# Patient Record
Sex: FEMALE | Race: WHITE | NOT HISPANIC OR LATINO | Employment: FULL TIME | URBAN - METROPOLITAN AREA
[De-identification: names, ages, dates, MRNs, and addresses within clinical notes are randomized per-mention and may not be internally consistent; named-entity substitution may affect disease eponyms.]

---

## 2018-06-28 ENCOUNTER — APPOINTMENT (OUTPATIENT)
Dept: OTHER | Facility: IMAGING CENTER | Age: 33
End: 2018-06-28

## 2018-07-18 ENCOUNTER — APPOINTMENT (OUTPATIENT)
Dept: OTHER | Facility: IMAGING CENTER | Age: 33
End: 2018-07-18

## 2018-07-28 ENCOUNTER — HOSPITAL ENCOUNTER (INPATIENT)
Facility: MEDICAL CENTER | Age: 33
LOS: 2 days | End: 2018-07-30
Admitting: OBSTETRICS & GYNECOLOGY
Payer: COMMERCIAL

## 2018-07-28 LAB
BASOPHILS # BLD AUTO: 0.2 % (ref 0–1.8)
BASOPHILS # BLD: 0.03 K/UL (ref 0–0.12)
EOSINOPHIL # BLD AUTO: 0 K/UL (ref 0–0.51)
EOSINOPHIL NFR BLD: 0 % (ref 0–6.9)
ERYTHROCYTE [DISTWIDTH] IN BLOOD BY AUTOMATED COUNT: 42.7 FL (ref 35.9–50)
HCT VFR BLD AUTO: 40 % (ref 37–47)
HGB BLD-MCNC: 14.4 G/DL (ref 12–16)
HOLDING TUBE BB 8507: NORMAL
IMM GRANULOCYTES # BLD AUTO: 0.1 K/UL (ref 0–0.11)
IMM GRANULOCYTES NFR BLD AUTO: 0.5 % (ref 0–0.9)
LYMPHOCYTES # BLD AUTO: 0.5 K/UL (ref 1–4.8)
LYMPHOCYTES NFR BLD: 2.6 % (ref 22–41)
MCH RBC QN AUTO: 33.7 PG (ref 27–33)
MCHC RBC AUTO-ENTMCNC: 36 G/DL (ref 33.6–35)
MCV RBC AUTO: 93.7 FL (ref 81.4–97.8)
MONOCYTES # BLD AUTO: 0.89 K/UL (ref 0–0.85)
MONOCYTES NFR BLD AUTO: 4.6 % (ref 0–13.4)
NEUTROPHILS # BLD AUTO: 17.91 K/UL (ref 2–7.15)
NEUTROPHILS NFR BLD: 92.1 % (ref 44–72)
NRBC # BLD AUTO: 0 K/UL
NRBC BLD-RTO: 0 /100 WBC
PLATELET # BLD AUTO: 225 K/UL (ref 164–446)
PMV BLD AUTO: 11 FL (ref 9–12.9)
RBC # BLD AUTO: 4.27 M/UL (ref 4.2–5.4)
WBC # BLD AUTO: 19.4 K/UL (ref 4.8–10.8)

## 2018-07-28 PROCEDURE — 10S0XZZ REPOSITION PRODUCTS OF CONCEPTION, EXTERNAL APPROACH: ICD-10-PCS | Performed by: OBSTETRICS & GYNECOLOGY

## 2018-07-28 PROCEDURE — 700105 HCHG RX REV CODE 258: Performed by: NURSE PRACTITIONER

## 2018-07-28 PROCEDURE — 770002 HCHG ROOM/CARE - OB PRIVATE (112)

## 2018-07-28 PROCEDURE — 85025 COMPLETE CBC W/AUTO DIFF WBC: CPT

## 2018-07-28 RX ORDER — OXYTOCIN 10 [USP'U]/ML
10 INJECTION, SOLUTION INTRAMUSCULAR; INTRAVENOUS ONCE
Status: COMPLETED | OUTPATIENT
Start: 2018-07-28 | End: 2018-07-29

## 2018-07-28 RX ORDER — ALUMINA, MAGNESIA, AND SIMETHICONE 2400; 2400; 240 MG/30ML; MG/30ML; MG/30ML
30 SUSPENSION ORAL EVERY 6 HOURS PRN
Status: DISCONTINUED | OUTPATIENT
Start: 2018-07-28 | End: 2018-07-29 | Stop reason: HOSPADM

## 2018-07-28 RX ORDER — DEXTROSE, SODIUM CHLORIDE, SODIUM LACTATE, POTASSIUM CHLORIDE, AND CALCIUM CHLORIDE 5; .6; .31; .03; .02 G/100ML; G/100ML; G/100ML; G/100ML; G/100ML
INJECTION, SOLUTION INTRAVENOUS CONTINUOUS
Status: DISCONTINUED | OUTPATIENT
Start: 2018-07-28 | End: 2018-07-30 | Stop reason: HOSPADM

## 2018-07-28 RX ORDER — MISOPROSTOL 200 UG/1
800 TABLET ORAL
Status: COMPLETED | OUTPATIENT
Start: 2018-07-28 | End: 2018-07-29

## 2018-07-28 RX ORDER — SODIUM CHLORIDE, SODIUM LACTATE, POTASSIUM CHLORIDE, CALCIUM CHLORIDE 600; 310; 30; 20 MG/100ML; MG/100ML; MG/100ML; MG/100ML
INJECTION, SOLUTION INTRAVENOUS CONTINUOUS
Status: DISPENSED | OUTPATIENT
Start: 2018-07-28 | End: 2018-07-28

## 2018-07-28 RX ADMIN — SODIUM CHLORIDE, POTASSIUM CHLORIDE, SODIUM LACTATE AND CALCIUM CHLORIDE: 600; 310; 30; 20 INJECTION, SOLUTION INTRAVENOUS at 14:18

## 2018-07-28 RX ADMIN — SODIUM CHLORIDE, SODIUM LACTATE, POTASSIUM CHLORIDE, CALCIUM CHLORIDE AND DEXTROSE MONOHYDRATE: 5; 600; 310; 30; 20 INJECTION, SOLUTION INTRAVENOUS at 21:54

## 2018-07-28 ASSESSMENT — COPD QUESTIONNAIRES
DURING THE PAST 4 WEEKS HOW MUCH DID YOU FEEL SHORT OF BREATH: NONE/LITTLE OF THE TIME
IN THE PAST 12 MONTHS DO YOU DO LESS THAN YOU USED TO BECAUSE OF YOUR BREATHING PROBLEMS: DISAGREE/UNSURE
COPD SCREENING SCORE: 0
HAVE YOU SMOKED AT LEAST 100 CIGARETTES IN YOUR ENTIRE LIFE: NO/DON'T KNOW
DO YOU EVER COUGH UP ANY MUCUS OR PHLEGM?: NO/ONLY WITH OCCASIONAL COLDS OR INFECTIONS

## 2018-07-28 ASSESSMENT — PATIENT HEALTH QUESTIONNAIRE - PHQ9
2. FEELING DOWN, DEPRESSED, IRRITABLE, OR HOPELESS: NOT AT ALL
1. LITTLE INTEREST OR PLEASURE IN DOING THINGS: NOT AT ALL
1. LITTLE INTEREST OR PLEASURE IN DOING THINGS: NOT AT ALL
2. FEELING DOWN, DEPRESSED, IRRITABLE, OR HOPELESS: NOT AT ALL
SUM OF ALL RESPONSES TO PHQ9 QUESTIONS 1 AND 2: 0
SUM OF ALL RESPONSES TO PHQ9 QUESTIONS 1 AND 2: 0

## 2018-07-28 ASSESSMENT — LIFESTYLE VARIABLES
ALCOHOL_USE: NO
EVER_SMOKED: NEVER

## 2018-07-28 NOTE — PROGRESS NOTES
1145- patient presents to LND with contractions every 2 minutes. States she has been leaking clear fluid since 7/27 at 2215. Patient has been having contractions since 0400. EFM and TOCO applied. Positive fetal heart tones. Patient , Nerissa patient's Duola and BILLIE Gan CNM at bedside.    1240- strip reviewed by BILLIE Gan CNM. Patient off EFM. Patient up to shower.     1345- patient back in bed. EFM and TOCO applied. Patient breathing well with contractions. Nerissa moncada and BILLIE Gan CNM at bedside.     1410- SVE as noted. Patient breathing well with contractions.     1615- SVE as noted. BILLIE Gan CNM remains at bedside    1900- report given to LAMAR Garrido RN

## 2018-07-28 NOTE — H&P
History and Physical      Lisa Sam is a 33 y.o. female  at 39w5d GA, admitted with C/O SROM at 2215 last night and regular contractions    Subjective:   positive  For CTXS.   positive Feels pain   positive for LOF  negative for vaginal bleeding.   positive for fetal movement    ROS: Pertinent items are noted in HPI.    History reviewed. No pertinent past medical history.  History reviewed. No pertinent surgical history.  OB History    Para Term  AB Living   1             SAB TAB Ectopic Molar Multiple Live Births                    # Outcome Date GA Lbr Balwinder/2nd Weight Sex Delivery Anes PTL Lv   1 Current                 Social History     Social History   • Marital status:      Spouse name: N/A   • Number of children: N/A   • Years of education: N/A     Occupational History   • Not on file.     Social History Main Topics   • Smoking status: Not on file   • Smokeless tobacco: Not on file   • Alcohol use Not on file   • Drug use: Unknown   • Sexual activity: Not on file     Other Topics Concern   • Not on file     Social History Narrative   • No narrative on file     Allergies: Sulfa drugs    Current Facility-Administered Medications:   •  LR infusion, , Intravenous, Continuous, Lori Gan, A.P.N.  •  fentaNYL (SUBLIMAZE) injection 50 mcg, 50 mcg, Intravenous, Q HOUR PRN, Lori Gan, A.P.N.  •  fentaNYL (SUBLIMAZE) injection 100 mcg, 100 mcg, Intravenous, Q HOUR PRN, Lori Gan, A.P.N.  •  mag hydrox-al hydrox-simeth (MAALOX PLUS ES or MYLANTA DS) suspension 30 mL, 30 mL, Oral, Q6HRS PRN, Lori Gan, A.P.N.  •  miSOPROStol (CYTOTEC) tablet 800 mcg, 800 mcg, Rectal, Once PRN, Lori Gan, A.P.N.    Prenatal care with Dr Salamanca until 3rd trimester starting in first trimester with transfer to [facility] at 34 weeks GA.  Her prenatal course is uncomplicated.  There are no active problems to display for this patient.              Objective:     "  Blood pressure 117/80, pulse 73, temperature 37.4 °C (99.3 °F), temperature source Temporal, resp. rate 18, height 1.651 m (5' 5\"), weight 68 kg (150 lb), last menstrual period 10/23/2017.    General:   alert, cooperative, appears fatigued   Skin:   normal   HEENT:  not evaluated   Lungs:   CTA bilateral   Heart:   S1, S2 normal, no murmur, click, rub or gallop, regular rate and rhythm, brisk carotid upstroke without bruits, peripheral pulses very brisk, chest is clear without rales or wheezing, no pedal edema, no JVD, no hepatosplenomegaly   Abdomen:   gravid, NT, Vtx by Leopolds,   EFW:  3200 gm   Pelvis:  adequate with gynecoid pelvis   FHT:  125 BPM   Uterine Size: S=D   Presentations: Cephalic   Cervix:     Dilation: 4-5    Effacement: 95%    Station:  -1    Consistency: Soft    Position: Middle     Lab Review  Lab:   Blood type: O positive, negative Ab screen, GBS negative, Hep B negative, rubella Immune, VDRL negative, HIV negative    No results found for this or any previous visit (from the past 5880 hour(s)).     Assessment:   Lisa Flaco at Unknown  Labor status: Active phase labor, SROM at 2215 7/27/18 clear,  GBS negative  Appropriate candidate for midwifery care  Category ! FHR, Reactive  Obstetrical history significant for There are no active problems to display for this patient.  .      Plan:     Admit to L&D  Intermittent monitoring, saline lock, labor support  Plans non-pharmacological pain management  Dr. Huntley (backup) informed of admit  GBS negative therefor no ABx               "

## 2018-07-28 NOTE — PROGRESS NOTES
Subjective:  Uterine contractions:yes  Pain: yes and coping well with labor support, complains of some back pain    Objective:  Fetal heart variability: moderate  Fetal Heart Rate decelerations: none  Fetal Heart Rate accelerations: yes  Baseline FHR: 135 per minute  Uterine contractions: regular, every 2 minutes  Cervical: 100% ;  Dilatation .6 ; Station positive1    Fetal position: Cephalic, LOT  Membranes ruptured: .yes, clear    Assessment:   32 yo G1 at 39w5d  Labor State: Active phase labor, SROM, clear, GBS negative  Category 1 FHR     Plan:  IV hydration  Labor support  Anticipate NSVB

## 2018-07-28 NOTE — PROGRESS NOTES
"Labor Progress Note    Lisa Flaco   34 yo G1 at 39w5d      Subjective:  Uterine contractions:yes  Pain: Not Asked, complains of urge to push, coping well with contractions and labor support    Objective:   Vitals:    07/28/18 1150   BP: 117/80   Pulse: 73   Resp: 18   Temp: 37.4 °C (99.3 °F)   TempSrc: Temporal   Weight: 68 kg (150 lb)   Height: 1.651 m (5' 5\")     Fetal heart variability: moderate  Fetal Heart Rate decelerations: early  Fetal Heart Rate accelerations: yes  Baseline FHR: 140 per minute  Fetal Non-stress Test: reactive tracing  Uterine contractions: regular, every 2-3 minutes  Cervical: 100% ;  Dilatation .7 ; Station positive1    Membranes ruptured: .yes    Meds:   Epidural : .no  Pitocin: .no    Labs:  Recent Results (from the past 24 hour(s))   Hold Blood Bank Specimen (Not Tested)    Collection Time: 07/28/18  2:30 PM   Result Value Ref Range    Holding Tube - Bb DONE    CBC WITH DIFFERENTIAL    Collection Time: 07/28/18  2:30 PM   Result Value Ref Range    WBC 19.4 (H) 4.8 - 10.8 K/uL    RBC 4.27 4.20 - 5.40 M/uL    Hemoglobin 14.4 12.0 - 16.0 g/dL    Hematocrit 40.0 37.0 - 47.0 %    MCV 93.7 81.4 - 97.8 fL    MCH 33.7 (H) 27.0 - 33.0 pg    MCHC 36.0 (H) 33.6 - 35.0 g/dL    RDW 42.7 35.9 - 50.0 fL    Platelet Count 225 164 - 446 K/uL    MPV 11.0 9.0 - 12.9 fL    Neutrophils-Polys 92.10 (H) 44.00 - 72.00 %    Lymphocytes 2.60 (L) 22.00 - 41.00 %    Monocytes 4.60 0.00 - 13.40 %    Eosinophils 0.00 0.00 - 6.90 %    Basophils 0.20 0.00 - 1.80 %    Immature Granulocytes 0.50 0.00 - 0.90 %    Nucleated RBC 0.00 /100 WBC    Neutrophils (Absolute) 17.91 (H) 2.00 - 7.15 K/uL    Lymphs (Absolute) 0.50 (L) 1.00 - 4.80 K/uL    Monos (Absolute) 0.89 (H) 0.00 - 0.85 K/uL    Eos (Absolute) 0.00 0.00 - 0.51 K/uL    Baso (Absolute) 0.03 0.00 - 0.12 K/uL    Immature Granulocytes (abs) 0.10 0.00 - 0.11 K/uL    NRBC (Absolute) 0.00 K/uL       Assessment  34 yo G1 at 39w5d GA, SROM clear, GBS negative  Labor " State: Active phase labor, desires un-medicated birth    Plan:  Expectant management, labor support with right side lying and peanut ball  Saline lock and po hydration   Anticipate NSVB  Dr. Huntley (backup) provided update        Lori Gan

## 2018-07-29 LAB
ERYTHROCYTE [DISTWIDTH] IN BLOOD BY AUTOMATED COUNT: 45.6 FL (ref 35.9–50)
HCT VFR BLD AUTO: 31.6 % (ref 37–47)
HGB BLD-MCNC: 10.9 G/DL (ref 12–16)
MCH RBC QN AUTO: 33.4 PG (ref 27–33)
MCHC RBC AUTO-ENTMCNC: 34.6 G/DL (ref 33.6–35)
MCV RBC AUTO: 96.6 FL (ref 81.4–97.8)
PLATELET # BLD AUTO: 188 K/UL (ref 164–446)
PMV BLD AUTO: 10.8 FL (ref 9–12.9)
RBC # BLD AUTO: 3.23 M/UL (ref 4.2–5.4)
WBC # BLD AUTO: 14.7 K/UL (ref 4.8–10.8)

## 2018-07-29 PROCEDURE — 770002 HCHG ROOM/CARE - OB PRIVATE (112)

## 2018-07-29 PROCEDURE — 700102 HCHG RX REV CODE 250 W/ 637 OVERRIDE(OP)

## 2018-07-29 PROCEDURE — 36415 COLL VENOUS BLD VENIPUNCTURE: CPT

## 2018-07-29 PROCEDURE — 304965 HCHG RECOVERY SERVICES

## 2018-07-29 PROCEDURE — 59409 OBSTETRICAL CARE: CPT

## 2018-07-29 PROCEDURE — 700111 HCHG RX REV CODE 636 W/ 250 OVERRIDE (IP)

## 2018-07-29 PROCEDURE — 0HQ9XZZ REPAIR PERINEUM SKIN, EXTERNAL APPROACH: ICD-10-PCS | Performed by: OBSTETRICS & GYNECOLOGY

## 2018-07-29 PROCEDURE — A9270 NON-COVERED ITEM OR SERVICE: HCPCS

## 2018-07-29 PROCEDURE — 85027 COMPLETE CBC AUTOMATED: CPT

## 2018-07-29 PROCEDURE — 700111 HCHG RX REV CODE 636 W/ 250 OVERRIDE (IP): Performed by: NURSE PRACTITIONER

## 2018-07-29 RX ORDER — HYDROCODONE BITARTRATE AND ACETAMINOPHEN 5; 325 MG/1; MG/1
1 TABLET ORAL EVERY 4 HOURS PRN
Status: DISCONTINUED | OUTPATIENT
Start: 2018-07-29 | End: 2018-07-30 | Stop reason: HOSPADM

## 2018-07-29 RX ORDER — MISOPROSTOL 200 UG/1
TABLET ORAL
Status: COMPLETED
Start: 2018-07-29 | End: 2018-07-29

## 2018-07-29 RX ORDER — LIDOCAINE HYDROCHLORIDE 10 MG/ML
20 INJECTION, SOLUTION INFILTRATION; PERINEURAL ONCE
Status: COMPLETED | OUTPATIENT
Start: 2018-07-29 | End: 2018-07-29

## 2018-07-29 RX ORDER — ACETAMINOPHEN 325 MG/1
650 TABLET ORAL EVERY 4 HOURS PRN
Status: DISCONTINUED | OUTPATIENT
Start: 2018-07-29 | End: 2018-07-30 | Stop reason: HOSPADM

## 2018-07-29 RX ORDER — IBUPROFEN 600 MG/1
600 TABLET ORAL EVERY 6 HOURS PRN
Status: DISCONTINUED | OUTPATIENT
Start: 2018-07-29 | End: 2018-07-30 | Stop reason: HOSPADM

## 2018-07-29 RX ORDER — VITAMIN A ACETATE, BETA CAROTENE, ASCORBIC ACID, CHOLECALCIFEROL, .ALPHA.-TOCOPHEROL ACETATE, DL-, THIAMINE MONONITRATE, RIBOFLAVIN, NIACINAMIDE, PYRIDOXINE HYDROCHLORIDE, FOLIC ACID, CYANOCOBALAMIN, CALCIUM CARBONATE, FERROUS FUMARATE, ZINC OXIDE, CUPRIC OXIDE 3080; 12; 120; 400; 1; 1.84; 3; 20; 22; 920; 25; 200; 27; 10; 2 [IU]/1; UG/1; MG/1; [IU]/1; MG/1; MG/1; MG/1; MG/1; MG/1; [IU]/1; MG/1; MG/1; MG/1; MG/1; MG/1
1 TABLET, FILM COATED ORAL EVERY MORNING
Status: DISCONTINUED | OUTPATIENT
Start: 2018-07-30 | End: 2018-07-30 | Stop reason: HOSPADM

## 2018-07-29 RX ORDER — ONDANSETRON 2 MG/ML
4 INJECTION INTRAMUSCULAR; INTRAVENOUS EVERY 6 HOURS PRN
Status: DISCONTINUED | OUTPATIENT
Start: 2018-07-29 | End: 2018-07-30 | Stop reason: HOSPADM

## 2018-07-29 RX ORDER — METHYLERGONOVINE MALEATE 0.2 MG/ML
0.2 INJECTION INTRAVENOUS PRN
Status: DISCONTINUED | OUTPATIENT
Start: 2018-07-29 | End: 2018-07-30 | Stop reason: HOSPADM

## 2018-07-29 RX ORDER — DOCUSATE SODIUM 100 MG/1
100 CAPSULE, LIQUID FILLED ORAL 2 TIMES DAILY PRN
Status: DISCONTINUED | OUTPATIENT
Start: 2018-07-29 | End: 2018-07-30 | Stop reason: HOSPADM

## 2018-07-29 RX ORDER — HYDROCODONE BITARTRATE AND ACETAMINOPHEN 10; 325 MG/1; MG/1
1 TABLET ORAL EVERY 4 HOURS PRN
Status: DISCONTINUED | OUTPATIENT
Start: 2018-07-29 | End: 2018-07-30 | Stop reason: HOSPADM

## 2018-07-29 RX ORDER — HYDROCODONE BITARTRATE AND ACETAMINOPHEN 5; 325 MG/1; MG/1
2 TABLET ORAL EVERY 4 HOURS PRN
Status: DISCONTINUED | OUTPATIENT
Start: 2018-07-29 | End: 2018-07-30 | Stop reason: HOSPADM

## 2018-07-29 RX ORDER — MISOPROSTOL 200 UG/1
800 TABLET ORAL PRN
Status: DISCONTINUED | OUTPATIENT
Start: 2018-07-29 | End: 2018-07-30 | Stop reason: HOSPADM

## 2018-07-29 RX ORDER — ACETAMINOPHEN 325 MG/1
325 TABLET ORAL EVERY 4 HOURS PRN
Status: DISCONTINUED | OUTPATIENT
Start: 2018-07-29 | End: 2018-07-30 | Stop reason: HOSPADM

## 2018-07-29 RX ORDER — ONDANSETRON 4 MG/1
4 TABLET, ORALLY DISINTEGRATING ORAL EVERY 6 HOURS PRN
Status: DISCONTINUED | OUTPATIENT
Start: 2018-07-29 | End: 2018-07-30 | Stop reason: HOSPADM

## 2018-07-29 RX ORDER — LIDOCAINE HYDROCHLORIDE 10 MG/ML
INJECTION, SOLUTION EPIDURAL; INFILTRATION; INTRACAUDAL; PERINEURAL
Status: COMPLETED
Start: 2018-07-29 | End: 2018-07-29

## 2018-07-29 RX ADMIN — MISOPROSTOL 800 MCG: 200 TABLET ORAL at 01:38

## 2018-07-29 RX ADMIN — LIDOCAINE HYDROCHLORIDE 30 ML: 10 INJECTION, SOLUTION INFILTRATION; PERINEURAL at 01:36

## 2018-07-29 RX ADMIN — LIDOCAINE HYDROCHLORIDE 30 ML: 10 INJECTION, SOLUTION EPIDURAL; INFILTRATION; INTRACAUDAL; PERINEURAL at 01:36

## 2018-07-29 RX ADMIN — OXYTOCIN 10 UNITS: 10 INJECTION, SOLUTION INTRAMUSCULAR; INTRAVENOUS at 01:21

## 2018-07-29 RX ADMIN — Medication 20 UNITS: at 01:48

## 2018-07-29 RX ADMIN — Medication 20 UNITS: at 02:21

## 2018-07-29 ASSESSMENT — PATIENT HEALTH QUESTIONNAIRE - PHQ9
2. FEELING DOWN, DEPRESSED, IRRITABLE, OR HOPELESS: NOT AT ALL
SUM OF ALL RESPONSES TO PHQ9 QUESTIONS 1 AND 2: 0
SUM OF ALL RESPONSES TO PHQ9 QUESTIONS 1 AND 2: 0
1. LITTLE INTEREST OR PLEASURE IN DOING THINGS: NOT AT ALL
1. LITTLE INTEREST OR PLEASURE IN DOING THINGS: NOT AT ALL
2. FEELING DOWN, DEPRESSED, IRRITABLE, OR HOPELESS: NOT AT ALL

## 2018-07-29 ASSESSMENT — PAIN SCALES - GENERAL
PAINLEVEL_OUTOF10: 3
PAINLEVEL_OUTOF10: 0
PAINLEVEL_OUTOF10: 0
PAINLEVEL_OUTOF10: 1
PAINLEVEL_OUTOF10: 1
PAINLEVEL_OUTOF10: 0

## 2018-07-29 NOTE — PROGRESS NOTES
-Bedside report received from EDYTA Cullen. POC discussed. KEMAL aGn at bedside along with FOB. Pt breathing well through contractions. VSS. No  Needs at this time   - Pt reports increasing pressure. SVE performed by BILLIE Gan CNM 2130- REBEKAH Gan at bedside continuously providing labor support to pt. SVE unchanged at .  2154-D5LR started per Gregory orders. Pt position changed to assist with labor progression. Pt swaying at side of bed and visited BR.   - REBEKAH Gan at bedside SVE complete but a anterior lip present but able to be pushed back with uc's.   0118-  of viable male with compound right arm anterior.  apgars 8/9.   0121-10 units IM Pitocin given per REBEKAH Gan orders.   0148- Heavy bleeding noted by REBEKAH Gan, orders to hang IV Pitocin and give cytotec.   EBL 600mls. Pads weighed for accuracy.   0200- Fundus firm at U with moderate/heavy rubra lochia. (see flowsheet)  0245-Pt assisted up to BR and voided successfully. Fundus firm with light lochia rubra.   0300-Pt transferred to PPU with infant in arms and bedside report given to EDYTA Nguyen.

## 2018-07-29 NOTE — PROGRESS NOTES
Pt transferred to floor from labor and delivery.  Pt oriented to room, safety procedures and call light. Pt is A&Ox4. Denies pain.  Assessment complete. Fundus firm, lochia mod. States voiding w/out difficulty, - for flatus. Plan of care reviewed, call light enc.  FOB @ bedside, bonding well w/.

## 2018-07-29 NOTE — CARE PLAN
Problem: Pain  Goal: Alleviation of Pain or a reduction in pain to the patient's comfort goal  Outcome: PROGRESSING AS EXPECTED  Patient wants a natural unassisted labor. Offered different positions and tools for patient management     Problem: Risk for Infection, Impaired Wound Healing  Goal: Remain free from signs and symptoms of infection  Outcome: PROGRESSING AS EXPECTED  Patient remains afebrile. No S&S of infections

## 2018-07-29 NOTE — PROGRESS NOTES
"Labor Progress Note    Lisa Flaco   39w5d      Subjective:  Uterine contractions:yes  Pain: Not Asked, coping well with urge to push    Objective:   Vitals:    07/28/18 1150 07/28/18 1352 07/28/18 1354   BP: 117/80 143/93 133/84   Pulse: 73 73 75   Resp: 18     Temp: 37.4 °C (99.3 °F)     TempSrc: Temporal     Weight: 68 kg (150 lb)     Height: 1.651 m (5' 5\")       Fetal heart variability: moderate  Fetal Heart Rate decelerations: none  Fetal Heart Rate accelerations: yes  Baseline FHR: 140 per minute  Uterine contractions: regular, every 2-3 minutes  Cervical: 90% swelling ;  Dilatation .8 ; Station positive1 , CRISELDA   Membranes ruptured: .yes    Meds:   Epidural : .no  Pitocin: .no    Labs:  Recent Results (from the past 24 hour(s))   Hold Blood Bank Specimen (Not Tested)    Collection Time: 07/28/18  2:30 PM   Result Value Ref Range    Holding Tube - Bb DONE    CBC WITH DIFFERENTIAL    Collection Time: 07/28/18  2:30 PM   Result Value Ref Range    WBC 19.4 (H) 4.8 - 10.8 K/uL    RBC 4.27 4.20 - 5.40 M/uL    Hemoglobin 14.4 12.0 - 16.0 g/dL    Hematocrit 40.0 37.0 - 47.0 %    MCV 93.7 81.4 - 97.8 fL    MCH 33.7 (H) 27.0 - 33.0 pg    MCHC 36.0 (H) 33.6 - 35.0 g/dL    RDW 42.7 35.9 - 50.0 fL    Platelet Count 225 164 - 446 K/uL    MPV 11.0 9.0 - 12.9 fL    Neutrophils-Polys 92.10 (H) 44.00 - 72.00 %    Lymphocytes 2.60 (L) 22.00 - 41.00 %    Monocytes 4.60 0.00 - 13.40 %    Eosinophils 0.00 0.00 - 6.90 %    Basophils 0.20 0.00 - 1.80 %    Immature Granulocytes 0.50 0.00 - 0.90 %    Nucleated RBC 0.00 /100 WBC    Neutrophils (Absolute) 17.91 (H) 2.00 - 7.15 K/uL    Lymphs (Absolute) 0.50 (L) 1.00 - 4.80 K/uL    Monos (Absolute) 0.89 (H) 0.00 - 0.85 K/uL    Eos (Absolute) 0.00 0.00 - 0.51 K/uL    Baso (Absolute) 0.03 0.00 - 0.12 K/uL    Immature Granulocytes (abs) 0.10 0.00 - 0.11 K/uL    NRBC (Absolute) 0.00 K/uL       Assessment/plan:   39w5d  Labor State: Active phase labor., Adequate uterine activity - " intensity and frequency. and Satisfactory labor progress.Some swelling of cervix with involuntary pushing, controlled now  Afebrile  Expectant Management with coaching of breathing to inhibit  Involuntary pushing, effective now.  Po hydration and position changes.          Lori Gan

## 2018-07-29 NOTE — PROGRESS NOTES
Labor Progress Note    Lisa Flaco   39w5d      Subjective:  Uterine contractions:yes  Pain: Yes. Severity: coping well, unmedicated    Objective:   Vitals:    07/28/18 1954 07/28/18 2042 07/28/18 2152 07/28/18 2158   BP:  117/65  123/70   Pulse:  73  70   Resp:       Temp: 36.6 °C (97.8 °F) 36.9 °C (98.5 °F) 36.8 °C (98.2 °F)    TempSrc: Temporal Temporal Temporal    Weight:       Height:         Fetal heart variability: moderate  Fetal Heart Rate decelerations: none  Fetal Heart Rate accelerations: yes  Baseline FHR: 145 per minute  Uterine contractions: regular, every 2-3 minutes  Cervical: 100% ;  Dilatation .9 ; Station positive1, LOT rotating to CRISELDA with UC, thick anterior lip   Membranes ruptured: .yes    Meds:   Epidural : no  Pitocin: .no    Labs:  Recent Results (from the past 24 hour(s))   Hold Blood Bank Specimen (Not Tested)    Collection Time: 07/28/18  2:30 PM   Result Value Ref Range    Holding Tube - Bb DONE    CBC WITH DIFFERENTIAL    Collection Time: 07/28/18  2:30 PM   Result Value Ref Range    WBC 19.4 (H) 4.8 - 10.8 K/uL    RBC 4.27 4.20 - 5.40 M/uL    Hemoglobin 14.4 12.0 - 16.0 g/dL    Hematocrit 40.0 37.0 - 47.0 %    MCV 93.7 81.4 - 97.8 fL    MCH 33.7 (H) 27.0 - 33.0 pg    MCHC 36.0 (H) 33.6 - 35.0 g/dL    RDW 42.7 35.9 - 50.0 fL    Platelet Count 225 164 - 446 K/uL    MPV 11.0 9.0 - 12.9 fL    Neutrophils-Polys 92.10 (H) 44.00 - 72.00 %    Lymphocytes 2.60 (L) 22.00 - 41.00 %    Monocytes 4.60 0.00 - 13.40 %    Eosinophils 0.00 0.00 - 6.90 %    Basophils 0.20 0.00 - 1.80 %    Immature Granulocytes 0.50 0.00 - 0.90 %    Nucleated RBC 0.00 /100 WBC    Neutrophils (Absolute) 17.91 (H) 2.00 - 7.15 K/uL    Lymphs (Absolute) 0.50 (L) 1.00 - 4.80 K/uL    Monos (Absolute) 0.89 (H) 0.00 - 0.85 K/uL    Eos (Absolute) 0.00 0.00 - 0.51 K/uL    Baso (Absolute) 0.03 0.00 - 0.12 K/uL    Immature Granulocytes (abs) 0.10 0.00 - 0.11 K/uL    NRBC (Absolute) 0.00 K/uL       Assessment:   39w5d  Labor  State: Active phase labor, protracted since last exam, SROM X 24 hours, afebrile, GBS negative  FHR cat 1  Coping fair with un-medicated per patient request  Plan:  IV D5LR, position changes, up to BR, on labor ball, and hands and knees with massage and labor support.  Update to Dr. Huntley via text  Anticipate NSVB      Lori Gan

## 2018-07-29 NOTE — PROGRESS NOTES
Pt called and needed lactation consult, pt seen by lactation team. Pt will allow baby to wake up more to proceed with feedings

## 2018-07-29 NOTE — PROGRESS NOTES
Labor Progress Note    Lisa Flaco   39w6d      Subjective:  Uterine contractions:yes  Pain: Not Asked    Objective:   Vitals:    07/28/18 2042 07/28/18 2152 07/28/18 2158 07/28/18 2253   BP: 117/65  123/70 (!) 97/52   Pulse: 73  70 77   Resp:       Temp: 36.9 °C (98.5 °F) 36.8 °C (98.2 °F)  36.9 °C (98.5 °F)   TempSrc: Temporal Temporal  Temporal   Weight:       Height:         Fetal heart variability: moderate  Fetal Heart Rate decelerations: none  Fetal Heart Rate accelerations: yes  Baseline FHR: 130 per minute  Uterine contractions: regular, every 2 minutes  Cervical: 100% ;  Dilatation .10 ; Station positive2    Membranes ruptured: .yes X 25 plus hours    Meds:   Epidural : .no  Pitocin: .no    Labs:  Recent Results (from the past 24 hour(s))   Hold Blood Bank Specimen (Not Tested)    Collection Time: 07/28/18  2:30 PM   Result Value Ref Range    Holding Tube - Bb DONE    CBC WITH DIFFERENTIAL    Collection Time: 07/28/18  2:30 PM   Result Value Ref Range    WBC 19.4 (H) 4.8 - 10.8 K/uL    RBC 4.27 4.20 - 5.40 M/uL    Hemoglobin 14.4 12.0 - 16.0 g/dL    Hematocrit 40.0 37.0 - 47.0 %    MCV 93.7 81.4 - 97.8 fL    MCH 33.7 (H) 27.0 - 33.0 pg    MCHC 36.0 (H) 33.6 - 35.0 g/dL    RDW 42.7 35.9 - 50.0 fL    Platelet Count 225 164 - 446 K/uL    MPV 11.0 9.0 - 12.9 fL    Neutrophils-Polys 92.10 (H) 44.00 - 72.00 %    Lymphocytes 2.60 (L) 22.00 - 41.00 %    Monocytes 4.60 0.00 - 13.40 %    Eosinophils 0.00 0.00 - 6.90 %    Basophils 0.20 0.00 - 1.80 %    Immature Granulocytes 0.50 0.00 - 0.90 %    Nucleated RBC 0.00 /100 WBC    Neutrophils (Absolute) 17.91 (H) 2.00 - 7.15 K/uL    Lymphs (Absolute) 0.50 (L) 1.00 - 4.80 K/uL    Monos (Absolute) 0.89 (H) 0.00 - 0.85 K/uL    Eos (Absolute) 0.00 0.00 - 0.51 K/uL    Baso (Absolute) 0.03 0.00 - 0.12 K/uL    Immature Granulocytes (abs) 0.10 0.00 - 0.11 K/uL    NRBC (Absolute) 0.00 K/uL       Assessment:   39w6d  Labor State: Active phase labor. Beginning second  stage  Prolonged ROM, GBS negative, afebrile  Cat 1 FHR    Plan:  Actively pushing following reduction of anterior lip  Cont. EFM   Continue D5LR at 125 cc/hr  Anticipate HUNTER Gan

## 2018-07-29 NOTE — DELIVERY NOTE
Vaginal Delivery Note    Lisa Sam is a  3, now para 1, who presented in active phase labor with SROM at 39w6d GA.  Patient progressed in active labor spontaneously to cervical exam 100%; cervical dilation 10; station +2 to complete the first stage of labor at 2327 and began actively pushing at 0000.  Patient then progressed through second stage and delivered spontaneously a viable male infant, OA, with compound right arm anterior, over a 1st degree lacerated perineum.  Nuchal cord not present.  Infant Apgar 8 and 9, and weight pending.    During the third stage the placenta was delivered spontaneously and was intact with 3 vessels.  Uterine atony was recognized and aggressive fundal massage was applied.  Pitocin 10 U IM had been administered and then IV Pitocin and Cytotec 800 mg was administered pr    Assisted extraction:  none    Perineum repair:  episiotomy repaired with 3-0 Vicryl    Analgesia: local 1% lidocaine    Epidural:  no    Family support:  yes    Infant bonding:  excellent    Estimated blood loss:  600 mL, weighed    Sponge count correct:  yes    Patient tolerated the procedure:  Excellent.  Mother and baby are in stable condition and bonding well.    Dr. Abiel Huntley is the collaborating MD

## 2018-07-29 NOTE — CARE PLAN
Problem: Pain  Goal: Alleviation of Pain or a reduction in pain to the patient's comfort goal    Intervention: Initial pain assessment  Pt want to labor naturally. She has great control through uc's. Breathing techniques encouraged.      Problem: Risk for Infection, Impaired Wound Healing  Goal: Remain free from signs and symptoms of infection    Intervention: Infection prevention measures  Practice proper hand hygiene before and after pt contact

## 2018-07-29 NOTE — PROGRESS NOTES
L&D Progress Note    Name:   Lisa Sam   Date/Time:  7/28/2018 8:05 PM  Gestational Age:  39w5d  Admit Date:   7/28/2018  Admitting Dx:   Pregnancy, active labor, SROM  Labor and delivery indication for care or intervention    Subjective:  Uterine contractions: yes  Pain:    yes  Complaints:   Yes, pressure   Headache: .  no  Blurred vision:  no   SOB:    no   Nausea/vomiting:  yes  Epigastric pain:  no  Fetal movement:  normal  Vaginal bleeding: . no    Objective:   Vitals:    07/28/18 1732 07/28/18 1907 07/28/18 1908 07/28/18 1954   BP: 112/59 133/72     Pulse: 69 73     Resp: 18      Temp: 37.2 °C (98.9 °F)  37 °C (98.6 °F) 36.6 °C (97.8 °F)   TempSrc: Temporal  Temporal Temporal   Weight:       Height:         Fetal heart variability: moderate  Fetal Heart Rate decelerations: variable  Fetal Heart Rate accelerations: yes  Baseline FHR: 140 per minute  Uterine contractions: regular, every 2-3 minutes  Cervical: 95 ;  Dilatation .9 ; Station positive1    Fetal position:   VTX, CRISELDA  Membranes ruptured: yes  AROM:  no  Meconium: .  no    IUPC: .   no  FSE .   no    Meds:   Epidural : .  no  Magnesium sulfate: . no  Pitocin: .  no    Labs:  Recent Results (from the past 72 hour(s))   Hold Blood Bank Specimen (Not Tested)    Collection Time: 07/28/18  2:30 PM   Result Value Ref Range    Holding Tube - Bb DONE    CBC WITH DIFFERENTIAL    Collection Time: 07/28/18  2:30 PM   Result Value Ref Range    WBC 19.4 (H) 4.8 - 10.8 K/uL    RBC 4.27 4.20 - 5.40 M/uL    Hemoglobin 14.4 12.0 - 16.0 g/dL    Hematocrit 40.0 37.0 - 47.0 %    MCV 93.7 81.4 - 97.8 fL    MCH 33.7 (H) 27.0 - 33.0 pg    MCHC 36.0 (H) 33.6 - 35.0 g/dL    RDW 42.7 35.9 - 50.0 fL    Platelet Count 225 164 - 446 K/uL    MPV 11.0 9.0 - 12.9 fL    Neutrophils-Polys 92.10 (H) 44.00 - 72.00 %    Lymphocytes 2.60 (L) 22.00 - 41.00 %    Monocytes 4.60 0.00 - 13.40 %    Eosinophils 0.00 0.00 - 6.90 %    Basophils 0.20 0.00 - 1.80 %    Immature Granulocytes  0.50 0.00 - 0.90 %    Nucleated RBC 0.00 /100 WBC    Neutrophils (Absolute) 17.91 (H) 2.00 - 7.15 K/uL    Lymphs (Absolute) 0.50 (L) 1.00 - 4.80 K/uL    Monos (Absolute) 0.89 (H) 0.00 - 0.85 K/uL    Eos (Absolute) 0.00 0.00 - 0.51 K/uL    Baso (Absolute) 0.03 0.00 - 0.12 K/uL    Immature Granulocytes (abs) 0.10 0.00 - 0.11 K/uL    NRBC (Absolute) 0.00 K/uL         Assessment:   Gestational Age:   39w5d  Risk Factors:   none  Labor State:    Active phase labor., Cat 1 FHR  Pregnancy Complications: none  Plan:    Continue present management, anticipate NSVB    There are no active problems to display for this patient.      DANIEL Patterson.

## 2018-07-29 NOTE — CARE PLAN
Problem: Communication  Goal: The ability to communicate needs accurately and effectively will improve    Intervention: Stewart patient and significant other/support system to call light to alert staff of needs  Pt educated on use of call light and white board for communication, pt verbalizes understanding of white board communication and times of rounding.        Problem: Safety  Goal: Will remain free from injury    Intervention: Provide assistance with mobility  Fall precaution in place, mobility assessed and signs placed outside pt door.  Encouraged pt to call for assistance when she needs to assitance.  Pt verbalizes understanding.  Bed in the lowest locked position.  Call light within reach.

## 2018-07-30 VITALS
BODY MASS INDEX: 24.99 KG/M2 | HEART RATE: 67 BPM | DIASTOLIC BLOOD PRESSURE: 70 MMHG | RESPIRATION RATE: 18 BRPM | SYSTOLIC BLOOD PRESSURE: 116 MMHG | HEIGHT: 65 IN | WEIGHT: 150 LBS | TEMPERATURE: 98 F

## 2018-07-30 RX ORDER — PSEUDOEPHEDRINE HCL 30 MG
100 TABLET ORAL 2 TIMES DAILY PRN
Qty: 60 CAP | COMMUNITY
Start: 2018-07-30 | End: 2020-11-23

## 2018-07-30 RX ORDER — IBUPROFEN 600 MG/1
600 TABLET ORAL EVERY 6 HOURS PRN
Qty: 30 TAB | COMMUNITY
Start: 2018-07-30 | End: 2020-11-23

## 2018-07-30 RX ORDER — ACETAMINOPHEN 325 MG/1
650 TABLET ORAL EVERY 4 HOURS PRN
Qty: 30 TAB | Refills: 0 | Status: SHIPPED | OUTPATIENT
Start: 2018-07-30 | End: 2019-06-11

## 2018-07-30 ASSESSMENT — PAIN SCALES - GENERAL
PAINLEVEL_OUTOF10: 0
PAINLEVEL_OUTOF10: 1
PAINLEVEL_OUTOF10: 2

## 2018-07-30 NOTE — DISCHARGE INSTRUCTIONS
POSTPARTUM DISCHARGE INSTRUCTIONS FOR MOM    YOB: 1985   Age: 33 y.o.               Admit Date: 7/28/2018     Discharge Date: 7/30/2018  Attending Doctor:  Elizabeth Mistry                  Allergies:  Sulfa drugs    Discharged to home by car. Discharged via wheelchair, hospital escort: Yes.  Special equipment needed: Not Applicable  Belongings with: Personal  Be sure to schedule a follow-up appointment with your primary care doctor or any specialists as instructed.     Discharge Plan:   Diet Plan: Discussed  Activity Level: Discussed  Confirmed Follow up Appointment: Patient to Call and Schedule Appointment  Confirmed Symptoms Management: Discussed  Medication Reconciliation Updated: Yes  Influenza Vaccine Indication: Indicated: Not available from distributor/    REASONS TO CALL YOUR OBSTETRICIAN:  1.   Persistent fever or shaking chills (Temperature higher than 100.4)  2.   Heavy bleeding (soaking more than 1 pad per hour); Passing clots  3.   Foul odor from vagina  4.   Mastitis (Breast infection; breast pain, chills, fever, redness)  5.   Urinary pain, burning or frequency  6.   Episiotomy infection    8.   Severe depression longer than 24 hours    HAND WASHING  · Prior to handling the baby.  · Before breastfeeding or bottle feeding baby.  · After using the bathroom or changing the baby's diaper.    VAGINAL CARE  · Nothing inside vagina for 6 weeks: no sexual intercourse, tampons or douching.  · Bleeding may continue for 2-4 weeks.  Amount may vary.    · Call your physician for heavy bleeding which means soaking more than 1 pad per hour    BIRTH CONTROL  · It is possible to become pregnant at any time after delivery and while breastfeeding.  · Plan to discuss a method of birth control with your physician at your follow up visit. visit.    DIET AND ELIMINATION  · Eating more fiber (bran cereal, fruits, and vegetables) and drinking plenty of fluids will help to avoid constipation.  · Urinary  "frequency after childbirth is normal.    POSTPARTUM BLUES  During the first few days after birth, you may experience a sense of the \"blues\" which may include impatience, irritability or even crying.  These feeling come and go quickly.  However, as many as 1 in 10 women experience emotional symptoms known as postpartum depression.    Postpartum depression:  May start as early as the second or third day after delivery or take several weeks or months to develop.  Symptoms of \"blues\" are present, but are more intense:  Crying spells; loss of appetite; feelings of hopelessness or loss of control; fear of touching the baby; over concern or no concern at all about the baby; little or no concern about your own appearance/caring for yourself; and/or inability to sleep or excessive sleeping.  Contact your physician if you are experiencing any of these symptoms.    Crisis Hotline:  · [place] Crisis Hotline:  1-253-MMFSERE  Or 1-396.478.9968  · [place] Crisis Hotline:  1-150.459.3165  Or 092-765-5140    PREVENTING SHAKEN BABY:  If you are angry or stressed, PUT THE BABY IN THE CRIB, step away, take some deep breaths, and wait until you are calm to care for the baby.  DO NOT SHAKE THE BABY.  You are not alone, call a supporter for help.    · Crisis Call Center 24/7 crisis line 991-865-4806 or 1-922.984.3064  · You can also text them, text \"ANSWER\" to 741689    QUIT SMOKING/TOBACCO USE:  I understand the use of any tobacco products increases my chance of suffering from future heart disease and could cause other illnesses which may shorten my life. Quitting the use of tobacco products is the single most important thing I can do to improve my health. For further information on smoking / tobacco cessation call a Toll Free Quit Line at 1-564.292.8327 (*[employer] [place]) or 1-904.640.6988 (American Lung Association) or you can access the web based program at www.lungusa.org.    · [place] Tobacco Users Help Line:  (279) " 057-6978       Toll Free: 4-659-872-0697  · Quit Tobacco Program [facility] (010)520-5733    DEPRESSION / SUICIDE RISK:  As you are discharged from this [facility] facility, it is important to learn how to keep safe from harming yourself.    Recognize the warning signs:  · Abrupt changes in personality, positive or negative- including increase in energy   · Giving away possessions  · Change in eating patterns- significant weight changes-  positive or negative  · Change in sleeping patterns- unable to sleep or sleeping all the time   · Unwillingness or inability to communicate  · Depression  · Unusual sadness, discouragement and loneliness  · Talk of wanting to die  · Neglect of personal appearance   · Rebelliousness- reckless behavior  · Withdrawal from people/activities they love  · Confusion- inability to concentrate     If you or a loved one observes any of these behaviors or has concerns about self-harm, here's what you can do:  · Talk about it- your feelings and reasons for harming yourself  · Remove any means that you might use to hurt yourself (examples: pills, rope, extension cords, firearm)  · Get professional help from the community (Mental Health, Substance Abuse, psychological counseling)  · Do not be alone:Call your Safe Contact- someone whom you trust who will be there for you.  · Call your local CRISIS HOTLINE 593-2189 or 822-456-1973  · Call your local Children's Mobile Crisis Response Team [place] (665) 391-1650 or www.Nginx  · Call the toll free National Suicide Prevention Hotlines   · National Suicide Prevention Lifeline 649-513-ZXGJ (2950)  · National Hope Line Network 800-SUICIDE (266-2598)    DISCHARGE SURVEY:  Thank you for choosing [facility].  We hope we provided you with very good care.  You may be receiving a survey in the mail.  Please fill it out.  Your opinion is valuable to us.    ADDITIONAL EDUCATIONAL MATERIALS GIVEN TO PATIENT:        My  signature on this form indicates that:  1.  I have reviewed and understand the above information  2.  My questions regarding this information have been answered to my satisfaction.  3.  I have formulated a plan with my discharge nurse to obtain my prescribed medication for home.

## 2018-07-30 NOTE — CARE PLAN
Problem: Altered physiologic condition related to immediate post-delivery state and potential for bleeding/hemorrhage  Goal: Patient physiologically stable as evidenced by normal lochia, palpable uterine involution and vital signs within normal limits  Outcome: PROGRESSING AS EXPECTED  Uterus firm, lochia light, pain controlled with PRN medications (see MAR). Lab values WDL for postpartum state. Cold/heat packs, extra blankets/pillows offered. VSS and WDL.     Problem: Potential for postpartum infection related to presence of episiotomy/vaginal tear and/or uterine contamination  Goal: Patient will be absent from signs and symptoms of infection  Outcome: PROGRESSING AS EXPECTED  No signs or symptoms of infection. VSS and WDL.

## 2018-07-30 NOTE — PROGRESS NOTES
Discharge instructions reviewed and signed; copy given to patient and placed in chart. F/U appointment discussed. Patient verbalizes understanding.

## 2018-07-30 NOTE — PROGRESS NOTES
1900: Report received from Gt. Patient stable.    :Assessment complete. Uterine fundus is firm, lochia is light. VSS and WDL. Tolerating diet. Voiding without difficulty. Tolerating ambulation well. Pain controlled with prn medication (see MAR). Will offer scheduled pain medications as they become available. Bonding well with baby. FOB at bedside. All questions and concerns regarding POC are addressed. Call light within reach and patient encouraged to call for additional needs.    2200: Patient holding infant in bedside chair. FOB present. No needs or concerns at this time.    0000: Patient awake in bed. Encouraged to call for needs.    0200: Patient awake and breastfeeding infant. RN helped with latch. No other needs at this time.    0400: Patient awake and breastfeeding. No needs at this time.    0600: Patient awake holding infant. Infant taken for  screen. Encouraged to call for needs.

## 2018-07-30 NOTE — PROGRESS NOTES
Patient walked out with  and baby and CNA to personal car to return home.  No change in patient's condition from prior assesment.

## 2018-07-30 NOTE — DISCHARGE SUMMARY
Discharge Summary:      Lisa Sam      Admit Date:   2018  Discharge Date:  2018     Admitting diagnosis:  Pregnancy  Labor and delivery indication for care or intervention  Discharge Diagnosis: Status post vaginal, spontaneous.  Pregnancy Complications: prolonged rupture of membranes during labor  Tubal Ligation:  no        History:  History reviewed. No pertinent past medical history.  OB History    Para Term  AB Living   1             SAB TAB Ectopic Molar Multiple Live Births                    # Outcome Date GA Lbr Balwinder/2nd Weight Sex Delivery Anes PTL Lv   1 Current                    Sulfa drugs  There are no active problems to display for this patient.       Hospital Course:   33 y.o. , now para 1, was admitted with SROM and active labor, underwent Active Labor, vaginal, spontaneous. Patient postpartum course was unremarkable, with progressive advancement in diet , ambulation and toleration of oral analgesia. Patient without complaints today and desires discharge.      Vitals:    18 0325 18 0800 18 1300 18   BP: 115/67 104/62 107/68 117/75   Pulse: 69 65 82 77   Resp:    Temp: 36.7 °C (98 °F) 36.4 °C (97.5 °F) 37.1 °C (98.7 °F) 36.6 °C (97.9 °F)   TempSrc:       Weight:       Height:           Current Facility-Administered Medications   Medication Dose   • ondansetron (ZOFRAN ODT) dispertab 4 mg  4 mg    Or   • ondansetron (ZOFRAN) syringe/vial injection 4 mg  4 mg   • oxytocin (PITOCIN) infusion (for postpartum)   mL/hr   • ibuprofen (MOTRIN) tablet 600 mg  600 mg   • acetaminophen (TYLENOL) tablet 325 mg  325 mg   • HYDROcodone-acetaminophen (NORCO) 5-325 MG per tablet 1 Tab  1 Tab   • HYDROcodone/acetaminophen (NORCO)  MG per tablet 1 Tab  1 Tab   • methylergonovine (METHERGINE) injection 0.2 mg  0.2 mg   • ibuprofen (MOTRIN) tablet 600 mg  600 mg   • miSOPROStol (CYTOTEC) tablet 800 mcg  800 mcg   • acetaminophen  (TYLENOL) tablet 325 mg  325 mg   • HYDROcodone-acetaminophen (NORCO) 5-325 MG per tablet 1 Tab  1 Tab   • HYDROcodone-acetaminophen (NORCO) 5-325 MG per tablet 2 Tab  2 Tab   • ondansetron (ZOFRAN) syringe/vial injection 4 mg  4 mg   • docusate sodium (COLACE) capsule 100 mg  100 mg   • prenatal plus vitamin (STUARTNATAL 1+1) 27-1 MG tablet 1 Tab  1 Tab   • acetaminophen (TYLENOL) tablet 650 mg  650 mg   • oxytocin (PITOCIN) 20 UNITS/1000ML LR (postpartum)   mL/hr   • D5LR infusion         Exam:  Breast Exam: Inspection negative. No nipple discharge or bleeding. No masses or nodularity palpable  Abdomen: Abdomen soft, non-tender. BS normal. No masses,  No organomegaly  Fundus Non Tender: yes  Incision: none  Perineum: perineum intact with repair, no ecchymosis and minimal edema  Extremity: extremities, peripheral pulses and reflexes normal, Homans sign is negative, no sign of DVT     Labs:  Recent Labs      07/28/18   1430  07/29/18 2021   WBC  19.4*  14.7*   RBC  4.27  3.23*   HEMOGLOBIN  14.4  10.9*   HEMATOCRIT  40.0  31.6*   MCV  93.7  96.6   MCH  33.7*  33.4*   MCHC  36.0*  34.6   RDW  42.7  45.6   PLATELETCT  225  188   MPV  11.0  10.8        Activity:   Discharge to home  Pelvic Rest x 6 weeks    Assessment:  normal postpartum course  Discharge Assessment: Voiding without difficulty     Follow up: .[facility] in 2 weeks with Lori Gan DNP, JOSUEM     Discharge Meds:   No current outpatient prescriptions on file.   OTC Advil or Tylenol, PNV    Lori Gan, A.P.N.

## 2018-07-30 NOTE — CONSULTS
Provided New Beginnings booklet and reviewed education and anticipated course of breastfeeding. Discussed techniques to help wake infant and to keep infant active at breast during feeding. Plans to follow-up at [facility] for outpatient support, placed on appointment scheduling list. Plans to call when ready for next feeding, reports infant is currently sleeping and finished feed at 0930. All questions answered.

## 2019-06-10 ENCOUNTER — OFFICE VISIT (OUTPATIENT)
Dept: URGENT CARE | Facility: CLINIC | Age: 34
End: 2019-06-10
Payer: COMMERCIAL

## 2019-06-10 VITALS
HEIGHT: 65 IN | DIASTOLIC BLOOD PRESSURE: 72 MMHG | OXYGEN SATURATION: 98 % | WEIGHT: 155 LBS | HEART RATE: 82 BPM | SYSTOLIC BLOOD PRESSURE: 116 MMHG | TEMPERATURE: 98.7 F | BODY MASS INDEX: 25.83 KG/M2 | RESPIRATION RATE: 16 BRPM

## 2019-06-10 DIAGNOSIS — L30.9 DERMATITIS OF FACE: ICD-10-CM

## 2019-06-10 PROCEDURE — 99203 OFFICE O/P NEW LOW 30 MIN: CPT | Performed by: PHYSICIAN ASSISTANT

## 2019-06-10 RX ORDER — TRIAMCINOLONE ACETONIDE 1 MG/G
CREAM TOPICAL
Qty: 30 G | Refills: 0 | Status: SHIPPED | OUTPATIENT
Start: 2019-06-10 | End: 2020-11-23

## 2019-06-10 ASSESSMENT — ENCOUNTER SYMPTOMS
SHORTNESS OF BREATH: 0
VOMITING: 0
HEADACHES: 0
EYE REDNESS: 0
COUGH: 0
ABDOMINAL PAIN: 0
DIARRHEA: 0
EYE PAIN: 0
EYE DISCHARGE: 0
FATIGUE: 0
DIZZINESS: 0
CHILLS: 0
FEVER: 0
NAUSEA: 0

## 2019-06-10 NOTE — PROGRESS NOTES
Subjective:   Lisa Watters is a 34 y.o. female who presents for Rash (x 1 day / face rash)       Rash   This is a new problem. The current episode started yesterday. The problem is unchanged. The affected locations include the face. The rash is characterized by itchiness and redness. Associated with: used sunscreen on face yesterday. Pertinent negatives include no congestion, cough, diarrhea, eye pain, facial edema, fatigue, fever, shortness of breath or vomiting. Treatments tried: benadryl. The treatment provided no relief.     Review of Systems   Constitutional: Negative for chills, fatigue and fever.   HENT: Negative for congestion.    Eyes: Negative for pain, discharge and redness.   Respiratory: Negative for cough and shortness of breath.    Cardiovascular: Negative for chest pain.   Gastrointestinal: Negative for abdominal pain, diarrhea, nausea and vomiting.   Skin: Positive for itching and rash (face).   Neurological: Negative for dizziness and headaches.   All other systems reviewed and are negative.      PMH:  has no past medical history on file.    MEDS:   Current Outpatient Prescriptions:   •  triamcinolone acetonide (KENALOG) 0.1 % Cream, Apply to affected area twice daily for up to 10 days, Disp: 30 g, Rfl: 0  •  ibuprofen (MOTRIN) 600 MG Tab, Take 1 Tab by mouth every 6 hours as needed (For cramping after delivery; do not give if patient is receiving ketorolac (Toradol))., Disp: 30 Tab, Rfl:   •  acetaminophen (TYLENOL) 325 MG Tab, Take 2 Tabs by mouth every four hours as needed for Fever (over 100.4 F)., Disp: 30 Tab, Rfl: 0  •  Prenatal MV-Min-Fe Fum-FA-DHA (PRENATAL 1 PO), Take  by mouth., Disp: , Rfl:   •  docusate sodium 100 MG Cap, Take 100 mg by mouth 2 times a day as needed for Constipation., Disp: 60 Cap, Rfl:     ALLERGIES:   Allergies   Allergen Reactions   • Sulfa Drugs Rash     Total body rash X 1       SURGHX: History reviewed. No pertinent surgical history.    SOCHX:   "reports that she has never smoked. She has never used smokeless tobacco. She reports that she does not drink alcohol.    FH: Reviewed with patient, not pertinent to this visit.     Objective:   /72 (BP Location: Left arm, Patient Position: Sitting, BP Cuff Size: Large adult)   Pulse 82   Temp 37.1 °C (98.7 °F) (Temporal)   Resp 16   Ht 1.651 m (5' 5\")   Wt 70.3 kg (155 lb)   LMP 10/23/2017 (Exact Date)   SpO2 98%   BMI 25.79 kg/m²   Physical Exam   Constitutional: She is oriented to person, place, and time. She appears well-developed and well-nourished. No distress.   HENT:   Head: Normocephalic and atraumatic.   Nose: Nose normal.   Eyes: Pupils are equal, round, and reactive to light. Conjunctivae and EOM are normal.   Neck: Normal range of motion. No tracheal deviation present.   Pulmonary/Chest: Effort normal. No respiratory distress.   Musculoskeletal:   ROM normal all four extremities   Neurological: She is alert and oriented to person, place, and time.   Skin: Skin is warm and dry.   Diffuse maculopapular erythematous rash on face. No vesicles or discharge present.    Psychiatric: She has a normal mood and affect. Her behavior is normal. Judgment and thought content normal.   Vitals reviewed.      Assessment/Plan:   1. Dermatitis of face  - triamcinolone acetonide (KENALOG) 0.1 % Cream; Apply to affected area twice daily for up to 10 days  Dispense: 30 g; Refill: 0    - Advised to keep face clean and dry  - Advised to avoid scented soaps/lotions, sunscreen  - Advised to return if symptoms worsen or do not improve    Differential diagnosis, natural history, supportive care, and indications for immediate follow-up discussed.  "

## 2019-06-11 ENCOUNTER — OFFICE VISIT (OUTPATIENT)
Dept: URGENT CARE | Facility: CLINIC | Age: 34
End: 2019-06-11
Payer: COMMERCIAL

## 2019-06-11 VITALS
RESPIRATION RATE: 16 BRPM | WEIGHT: 155 LBS | OXYGEN SATURATION: 97 % | SYSTOLIC BLOOD PRESSURE: 116 MMHG | DIASTOLIC BLOOD PRESSURE: 60 MMHG | HEART RATE: 84 BPM | BODY MASS INDEX: 25.83 KG/M2 | HEIGHT: 65 IN | TEMPERATURE: 98.7 F

## 2019-06-11 DIAGNOSIS — R21 RASH OF FACE: ICD-10-CM

## 2019-06-11 DIAGNOSIS — L23.9 ALLERGIC DERMATITIS: ICD-10-CM

## 2019-06-11 PROCEDURE — 99214 OFFICE O/P EST MOD 30 MIN: CPT | Performed by: NURSE PRACTITIONER

## 2019-06-11 RX ORDER — PREDNISONE 20 MG/1
20 TABLET ORAL DAILY
Qty: 4 TAB | Refills: 0 | Status: SHIPPED | OUTPATIENT
Start: 2019-06-11 | End: 2019-06-15

## 2019-06-11 RX ORDER — METHYLPREDNISOLONE SODIUM SUCCINATE 125 MG/2ML
125 INJECTION, POWDER, LYOPHILIZED, FOR SOLUTION INTRAMUSCULAR; INTRAVENOUS ONCE
Status: COMPLETED | OUTPATIENT
Start: 2019-06-11 | End: 2019-06-11

## 2019-06-11 RX ADMIN — METHYLPREDNISOLONE SODIUM SUCCINATE 125 MG: 125 INJECTION, POWDER, LYOPHILIZED, FOR SOLUTION INTRAMUSCULAR; INTRAVENOUS at 08:52

## 2019-06-11 ASSESSMENT — ENCOUNTER SYMPTOMS
VOMITING: 0
EYE PAIN: 0
SHORTNESS OF BREATH: 0
FEVER: 0
SORE THROAT: 0
CHILLS: 0
MYALGIAS: 0
NAUSEA: 0
DIZZINESS: 0

## 2019-06-11 NOTE — PROGRESS NOTES
"Subjective:   Lisa Watters is a 34 y.o. female who presents for Rash (was seen yesterday, rash getting worse, itch, getting worse, in ears and neck)        HPIPatient is a 34-year-old female who returns clinic today after she was evaluated yesterday for a facial rash.  Patient states that the rash is getting worse as of this morning.  She complains of pruritus, itching in the ears and neck.  Patient believes it may have been a sunscreen that she used over the weekend as it was a new type of sunscreen that she has never used.  Patient has taken a Benadryl with no relief in symptoms.  Patient was prescribed a topical steroid which she is used once with no real relief.  She denies any shortness of breath, angioedema.    Review of Systems   Constitutional: Negative for chills and fever.   HENT: Negative for sore throat.    Eyes: Negative for pain.   Respiratory: Negative for shortness of breath.    Cardiovascular: Negative for chest pain.   Gastrointestinal: Negative for nausea and vomiting.   Genitourinary: Negative for hematuria.   Musculoskeletal: Negative for myalgias.   Skin: Positive for itching and rash.   Neurological: Negative for dizziness.     Allergies   Allergen Reactions   • Sulfa Drugs Rash     Total body rash X 1      Objective:   /60 (BP Location: Left arm, Patient Position: Sitting, BP Cuff Size: Adult)   Pulse 84   Temp 37.1 °C (98.7 °F) (Temporal)   Resp 16   Ht 1.651 m (5' 5\")   Wt 70.3 kg (155 lb)   LMP 10/23/2017 (Exact Date)   SpO2 97%   Breastfeeding? Yes   BMI 25.79 kg/m²   Physical Exam   Constitutional: She is oriented to person, place, and time. She appears well-developed and well-nourished. No distress.   HENT:   Head: Normocephalic and atraumatic.   Eyes: Pupils are equal, round, and reactive to light. Conjunctivae and EOM are normal.   Cardiovascular: Normal rate and regular rhythm.    No murmur heard.  Pulmonary/Chest: Effort normal and breath sounds " normal. No respiratory distress.   Abdominal: Soft. She exhibits no distension. There is no tenderness.   Neurological: She is alert and oriented to person, place, and time. She has normal reflexes. No sensory deficit.   Skin: Skin is warm and dry. Rash noted. Rash is maculopapular.        Psychiatric: She has a normal mood and affect.         Assessment/Plan:     1. Allergic dermatitis  methylPREDNISolone sod succ (SOLU-MEDROL) 125 MG injection 125 mg    predniSONE (DELTASONE) 20 MG Tab   2. Rash of face       Patient is a 34-year-old female presented with the stated above.  Rash does appear to be worsening.  Injection of steroids given in clinic.  Will have patient start oral steroids if necessary.  Encourage patient to start over-the-counter Zyrtec and Zantac.  Patient given precautionary s/sx that mandate immediate follow up and evaluation in the ED. Advised of risks of not doing so.    DDX, Supportive care, and indications for immediate follow-up discussed with patient.    Instructed to return to clinic or nearest emergency department if we are not available for any change in condition, further concerns, or worsening of symptoms.    The patient demonstrated a good understanding and agreed with the treatment plan.

## 2019-06-13 ENCOUNTER — APPOINTMENT (OUTPATIENT)
Dept: URGENT CARE | Facility: CLINIC | Age: 34
End: 2019-06-13
Payer: COMMERCIAL

## 2019-07-23 ENCOUNTER — OFFICE VISIT (OUTPATIENT)
Dept: URGENT CARE | Facility: CLINIC | Age: 34
End: 2019-07-23
Payer: COMMERCIAL

## 2019-07-23 VITALS
WEIGHT: 123.24 LBS | TEMPERATURE: 98 F | OXYGEN SATURATION: 97 % | HEIGHT: 65 IN | RESPIRATION RATE: 16 BRPM | BODY MASS INDEX: 20.53 KG/M2 | SYSTOLIC BLOOD PRESSURE: 110 MMHG | DIASTOLIC BLOOD PRESSURE: 70 MMHG | HEART RATE: 84 BPM

## 2019-07-23 DIAGNOSIS — L30.9 DERMATITIS: ICD-10-CM

## 2019-07-23 PROCEDURE — 99214 OFFICE O/P EST MOD 30 MIN: CPT | Performed by: NURSE PRACTITIONER

## 2019-07-23 RX ORDER — PREDNISONE 20 MG/1
20 TABLET ORAL DAILY
Qty: 4 TAB | Refills: 0 | Status: SHIPPED | OUTPATIENT
Start: 2019-07-23 | End: 2019-07-27

## 2019-07-23 ASSESSMENT — ENCOUNTER SYMPTOMS
DOUBLE VISION: 0
PALPITATIONS: 0
FEVER: 0
STRIDOR: 0
PHOTOPHOBIA: 0
MUSCULOSKELETAL NEGATIVE: 1
BLURRED VISION: 0
CHILLS: 0
SORE THROAT: 0
WHEEZING: 0
COUGH: 0

## 2019-07-23 NOTE — PROGRESS NOTES
Subjective:   Lisa Watters is a 34 y.o. female who presents for Rash (possible reaction to sunscreen)        Rash   This is a new problem. The current episode started in the past 7 days. The problem is unchanged. The rash is diffuse (All areas without face). The rash is characterized by redness and itchiness. Associated with: States used sunscreen and believes it is this since she has similar rash a month ago after using the same sunscreen. Pertinent negatives include no congestion, cough, fever or sore throat. Past treatments include nothing. The treatment provided no relief.        Review of Systems   Constitutional: Negative for chills and fever.   HENT: Negative for congestion, ear discharge, ear pain and sore throat.    Eyes: Negative for blurred vision, double vision and photophobia.   Respiratory: Negative for cough, wheezing and stridor.    Cardiovascular: Negative for chest pain and palpitations.   Musculoskeletal: Negative.    Skin: Positive for rash. Negative for itching.   All other systems reviewed and are negative.    PMH:  has no past medical history on file.  MEDS:   Current Outpatient Prescriptions:   •  predniSONE (DELTASONE) 20 MG Tab, Take 1 Tab by mouth every day for 4 days., Disp: 4 Tab, Rfl: 0  •  Prenatal Vit-Fe Fumarate-FA (PRENATAL MULTIVITAMIN/IRON PO), PRENATAL VITAMIN TABS, Disp: , Rfl:   •  triamcinolone acetonide (KENALOG) 0.1 % Cream, Apply to affected area twice daily for up to 10 days, Disp: 30 g, Rfl: 0  •  ibuprofen (MOTRIN) 600 MG Tab, Take 1 Tab by mouth every 6 hours as needed (For cramping after delivery; do not give if patient is receiving ketorolac (Toradol))., Disp: 30 Tab, Rfl:   •  docusate sodium 100 MG Cap, Take 100 mg by mouth 2 times a day as needed for Constipation., Disp: 60 Cap, Rfl:   •  Prenatal MV-Min-Fe Fum-FA-DHA (PRENATAL 1 PO), Take  by mouth., Disp: , Rfl:   ALLERGIES:   Allergies   Allergen Reactions   • Sulfa Drugs Rash     Total body rash  "X 1     SURGHX: History reviewed. No pertinent surgical history.  SOCHX:  reports that she has never smoked. She has never used smokeless tobacco. She reports that she drinks alcohol. She reports that she does not use drugs.  FH: Family history was reviewed, no pertinent findings to report       Objective:   /70   Pulse 84   Temp 36.7 °C (98 °F) (Temporal)   Resp 16   Ht 1.651 m (5' 5\")   Wt 55.9 kg (123 lb 3.8 oz)   SpO2 97%   BMI 20.51 kg/m²   Physical Exam   Constitutional: She appears well-developed and well-nourished. No distress.   HENT:   Head: Normocephalic.   Right Ear: Hearing normal.   Left Ear: Hearing normal.   Nose: Nose normal.   Eyes: Pupils are equal, round, and reactive to light. Conjunctivae, EOM and lids are normal.   Neck: Normal range of motion.   Cardiovascular: Normal rate, regular rhythm and normal heart sounds.    Pulmonary/Chest: Effort normal and breath sounds normal. No respiratory distress. She has no decreased breath sounds. She has no wheezes.   Neurological: She is alert.   Skin: Skin is warm. Rash noted. Rash is maculopapular. Rash is not macular, not pustular and not vesicular. She is not diaphoretic.   Diffuse (not including head) maculopapular rash noted without vesicles or pustules. Mildly erythematous.   Psychiatric: She has a normal mood and affect. Her speech is normal and behavior is normal. Judgment and thought content normal.   Vitals reviewed.        Assessment/Plan:   Assessment    1. Dermatitis  - predniSONE (DELTASONE) 20 MG Tab; Take 1 Tab by mouth every day for 4 days.  Dispense: 4 Tab; Refill: 0  - REFERRAL TO ALLERGY    Discussed with patient and most likely related to sunscreen.  Referral to allergy for potential allergy testing    Differential diagnosis, natural history, supportive care, and indications for immediate follow-up discussed.     **Please note that all invasive procedures during this visit were performed by myself and/or the Medical " Assistant under the supervision of the PA or MD in office**

## 2019-08-09 ENCOUNTER — APPOINTMENT (OUTPATIENT)
Dept: URGENT CARE | Facility: CLINIC | Age: 34
End: 2019-08-09
Payer: COMMERCIAL

## 2020-07-30 ENCOUNTER — OFFICE VISIT (OUTPATIENT)
Dept: OBGYN | Facility: CLINIC | Age: 35
End: 2020-07-30
Payer: COMMERCIAL

## 2020-07-30 DIAGNOSIS — O92.29 SORE NIPPLES DUE TO LACTATION: ICD-10-CM

## 2020-07-30 DIAGNOSIS — N64.3 HYPERLACTATION: ICD-10-CM

## 2020-07-30 PROCEDURE — 99204 OFFICE O/P NEW MOD 45 MIN: CPT | Performed by: NURSE PRACTITIONER

## 2020-07-30 NOTE — PROGRESS NOTES
Subjective:   Lisa Watters is a 35 y.o. female here to establish lactation care. She is here today with baby Giovanni.    Concerns:   nipple pain  and oversupply   HPI:   Pertinent  history:   Mother does not have a history of advanced maternal age, GDM, hypertension prior to pregnancy, insulin resistance, multiple gestation, PCOS and thyroid disease. These are common conditions which may interfere in establishing milk supply.    FEEDING HISTORY:    Past breastfeeding history:  First baby  but oversupply and choking throughout, one sided nursing, a lot of work, refused a bottle.    Hospital course: latched well  Currently: Nursing every 1-3, demand, around the clock. One side at a time, occasionally uses haakaa, no pumping yet, has Medela PNS.  Left nipple sore, cracked  Both breasts: No   Bottle feeds: none/24h      Supplement: None    Nipple Shield Use: None      Breast Pumping:    Type of pump: Medela PNS      ROS:  Constitutional:   no fever, chills. Feeling well  Extremities Swelling: No extremity swelling  Gastrointestinal:  Negative for nausea, vomiting  Breasts: No soreness of breasts and Soreness of nipples  Psychiatric: Managing ok  Mental Health:  No mention of feeling irritable, agitated, angry, overwhelmed, apathy, exhaustion nor having sleep changes outside infant feeds/demands or appetite changes       Objective:     General: no acute distress  Neurological:  Alert and oriented x3  Breasts: Soft, Plugged Duct - no evidence and Mastitis  - no S/S right side observed  Nipples: intact right side bserved  Psychiatric:  Normal mood and affect. Her behavior is normal. Judgment and thought content normal   Mental Health:  Did NOT exhibit sadness, crying, feeling overwhelmed, agitation or hypervigilance.       Assessment/Plan & Lactation Counseling:     Infant Weight History:   20 8#6.0oz  7/10/20 8#6/0oz  20 8#11oz  20 9#4.0oz    Infant intake at Breast:: L did  not offer    R 1.9oz    Total not a full feeding although ready to eat, content after feeding, offered right second time and no real interest, fell asleep.  Milk Transfer at this feeding:   Effective breastfeeding based on good growth, varying amounts at each feeding  Initiation of Feeding: Infant initiates  Position of Feeding:    Right: laid back  Attachment Achieved: rapidly  Nipple shield: N/A   Suck Pattern at the breast: Suck burst and normal rest, seems shallow but no pain, does take sufficient amount of milk in mouth    Behavior Following Observed Feeding: content, changed, oral exam, fussy, offered breast no interest and asleep  Nipple Pain from: shallow latch left side and nipple has not been able to heal     Latch: Mom latches independently  Suckling/Feeding: attaches, baby roots, elicits TIP and rhythmic  Milk Supply Available: abundant supply, sucking,chocking and one sided nursing support hyperlactation    Diagnosis/Problem  Hyperlactation  Sore nipples        Discussed concerns and symptoms as listed above in assessment and guidance summarized below.  • Topics reviewed included: The nature of infants oral head/neck structure and function and its impact on latch and transfer of milk.   o Discussed Mechanical forces resulting in strain patterns during intrauterine life and during birth may negatively affect the oral-motor function of the  and compromise structure and function.  Joint restriction or hypo-mobility could be a logical progression following the relative lack of mobility during the last crowded months of gestation. An exceptionally flexed or rotated fetal posture may tighten myofascial structures in the neck, restricting the hyoid bone and strap muscles that support an effective swallow. More research reveals the body as an integrated whole via its major structure-maintaining and sensory organ, the fascia.  Other musculoskeletal issues, such as neck preferences, may also affect an  infant's ability to nurse. These views have expanded and deepened over time.  • Observed Good neck movement, slight preference to the left, very slight, not tight, tongue has poor lift, poor extension poor cupping but laterlizes well  •  Sleep or lack of and strategies to manage restorative sleep, although short amounts, significant to the mental health of the mother. Can be done with introducing bottle  • Hyperlactation (Oversupply) This is a high rate of milk production often causing breast discomfort and or compelling moms to express beyond what the baby is taking.There is no defined clinical criteria. Infant can present with stopping to suckle/letting go, milk spraying in the baby's face, baby coughing or choking or breast refusal, struggle with initial letdown with gasping, fussiness, rapid weight gain (1# a week), excessive gas and refusing the second breast or breast with larger supply.   o Causes:   - [place] phenomenon breasts don't respond to feedback of fullness  - Large storage capacity   Management    Block feeding   Feed from 1 breast for  2-4  hour block of time   Noticeable drop in supply by 36-48 hours   If resting side too full, pump (or Haakaa) MINIMALLY to comfort  Follow up weight mandatory while deliberately decreasing supply  Medications not indicated at this time    • Foremilk Hind milk imbalance this is only problematic with high supply. With high supply high lactose load leading to heavy gut osmotic load causing frequent green, mucous stools, stooling during feeding and bothersome gas.  •  Feeding:Weight gain indicated sufficient intake. Recommend if baby wants to eat a second time an hour or hour and a half after first feeding, stay on same breast.  •  Supplement: No supplement is needed  o  Latch on Techniques for bare breast on left side that is still sore.  o Fine tune latch:  - By holding your baby more securely at the breast, assisting your baby to stay attached by:  - Bringing your  baby to your breast, not breast to the baby  - Your baby's cheek to touch breast securely, nose tipped back  - Hold your baby firmly in place so when your baby forgets to suck and picks it back up again your baby is in the correct spot. You will be extinguishing behavior and replacing it with a deeper latch to stimulate suck and provide satisfaction at the breast  - Your baby needs as much breast as deep in the mouth as possible to allow your nipples to heal and for you more importantly to maximize efficiency at the breast  • Latch is asymmetrical, leading with the chin, getting more underneath  •  Pumping guidelines:  o Haakaa to not induce more milk  o Personal pump when back to work  o Type of pump:  • Personal   •  Connect with other mothers:  o [employer]:   - [place] [facility]: [email or web address]  - Well-Nourished Babies (Private group for questions and support): [email or web address]  o Breastfeeding [place] for support not assessment: Tuesday by Zoom, email invitation will be sent.  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 Discussed alternative therapies to facilitate infant gas including chiro, CST and  PT. Referred to ENT    Mom expressed understanding, and asked appropriate questions    Summary:Experienced breastfeeder, difficulty with oversupply first baby and  wondering if a tongue tie is a causative factor. Reviewed latch,  breastfeeding and did an oral exam. Pain persists on moms left nipple secondary to latch with an infant that has a minimal neck preference to the right. He is not tight through the neck that would lead to this difficulty with latch and soreness. Gaining well. ENT evaluation.    Follow-up for infant weight check and dyad breastfeeding evaluation as needed for weight to be followed when purposely decreasing supply.Please call [phone] if you have not scheduled your next appointment    A total of 50 minutes, this does not include infant assessment time, were spent face to face with more than 50% spent counseling and coordinating care as detailed in the above note.         PLEASE NOTE: Some of this note was created using voice recognition software. I have made every reasonable attempt to correct obvious errors, but I expect that there may be errors of grammar and possibly content that I did not discover prior finalizing this note.  DALTON Pope.

## 2020-09-25 ENCOUNTER — OFFICE VISIT (OUTPATIENT)
Dept: OBGYN | Facility: CLINIC | Age: 35
End: 2020-09-25
Payer: COMMERCIAL

## 2020-09-25 DIAGNOSIS — O92.70 LACTATION PROBLEM: ICD-10-CM

## 2020-09-25 PROBLEM — O92.29 SORE NIPPLES DUE TO LACTATION: Status: RESOLVED | Noted: 2020-07-30 | Resolved: 2020-09-25

## 2020-09-25 PROCEDURE — 99214 OFFICE O/P EST MOD 30 MIN: CPT | Performed by: NURSE PRACTITIONER

## 2020-09-25 NOTE — PROGRESS NOTES
Summary: Infant growth faltering, will increase milk in bottles that is used while with grandma and mom at work. Will do block feeding in the am two feedings one side to access higher fat milk, will offer second breast in the later evening to allow for more intake.  Tongue is not restrictive. This is a reflux and self regulating problem that medication is NOT called for.  Goal: more higher fat milk and volume in without increasing spit up.   Subjective:   Lisa Watters is a 35 y.o. female here to follow up lactation care. She is here today with baby Giovanni.    Concerns:   Infant growth slowed    HPI:   Pertinent  history:   Mother does not have a history of advanced maternal age, GDM, hypertension prior to pregnancy, insulin resistance, multiple gestation, PCOS and thyroid disease. These are common conditions which may interfere in establishing milk supply.    FEEDING HISTORY:    Past breastfeeding history:  First baby  but oversupply and choking throughout, one sided nursing, a lot of work, refused a bottle.    Hospital course: latched well  Prior to  visit: Nursing every 1-3, demand, around the clock. One side at a time, occasionally uses haakaa, no pumping yet, has Medela PNS.  Left nipple sore, cracked  Currently nursing one side each feeding, grandma bottle feeding when watching him and  was 2oz but has increased in the last 2 weeks.  Still spits up but not in pain, happy and content  Both breasts: No   Bottle feeds: two working days per week, bottles from [facility]      Supplement: None    Nipple Shield Use: None      Breast Pumping:    Type of pump: Medela PNS      ROS:  Constitutional:   no fever, chills. Feeling well  Extremities Swelling: No extremity swelling  Gastrointestinal:  Negative for nausea, vomiting  Breasts: No soreness of breasts and Soreness of nipples  Psychiatric: Managing ok  Mental Health:  No mention of feeling irritable, agitated, angry, overwhelmed,  apathy, exhaustion nor having sleep changes outside infant feeds/demands or appetite changes       Objective:     General: no acute distress  Neurological:  Alert and oriented x3  Breasts: Soft, Plugged Duct - no evidence and Mastitis  - no S/S right side observed  Nipples: intact right side bserved  Psychiatric:  Normal mood and affect. Her behavior is normal. Judgment and thought content normal   Mental Health:  Did NOT exhibit sadness, crying, feeling overwhelmed, agitation or hypervigilance.       Assessment/Plan & Lactation Counseling:     Infant Weight History:   7/8/20 8#6.0oz  7/10/20 8#6/0oz  7/22/20 8#11oz  7/30/20 9#4.0oz  56%  8/17/20: 10#4.4oz  40%  9/16/20: 11#4.6oz 15%  9/22/2011#4.6oz 10.47%  9/25/20 11#10.7oz 13.57%       Infant intake at Breast:: L 3.8oz in under 10 minutes    R did not offer      Milk Transfer at this feeding:   Effective breastfeeding based on good growth, varying amounts at each feeding  Initiation of Feeding: Infant initiates  Position of Feeding:    Right: laid back  Attachment Achieved: rapidly  Nipple shield: N/A   Suck Pattern at the breast: Suck burst and normal rest, seems shallow but no pain, does take sufficient amount of milk in mouth    Behavior Following Observed Feeding: content, changed, oral exam, fussy, offered breast no interest and asleep  Nipple Pain from: shallow latch left side and nipple has not been able to heal     Latch: Mom latches independently  Suckling/Feeding: attaches, baby roots, elicits TIP and rhythmic  Milk Supply Available: abundant supply, sucking,chocking and one sided nursing support hyperlactation    Diagnosis/Problem  Hyperlactation  Lactation Problem: Infant growth slowed exclusively breastfeeding/breastmilk        Discussed concerns and symptoms as listed above in assessment and guidance summarized below.  • Topics reviewed included:   • Infant oral/structure: Observed Good neck movement,  Tongue lifting 80% to palate, lateralizes well,  not interested in cupping on finger, tongue extends to lip, no stop sign under the tongue, no visual frenulum  • Foremilk Hind milk imbalance This may be one reason infant weight change; in am offer 2 feedings on one side and maybe use haakaa for comfort. 3rd feeding start on other side x2 no haakaa. Attempt to get higher fat intake and decrease supply from 8-9oz total to 4-5oz  •  Feeding:Weight intake  indicates sufficient intake per feeding, spitting up reduces that total intake. Balancing more milk in to reduce supply for more available fat.   • Recommend if baby wants to eat a second time an hour or hour and a half after first feeding, stay on same breast.   Supplement: No supplement is needed  Weight check in 2 weeks.       Mom expressed understanding, and asked appropriate questions        Follow-up for infant weight check and dyad breastfeeding evaluation as needed for weight to be followed when purposely decreasing supply.Please call [phone] if you have not scheduled your next appointment    A total of 25 minutes, this does not include infant assessment time, were spent face to face with more than 50% spent counseling and coordinating care as detailed in the above note.         PLEASE NOTE: Some of this note was created using voice recognition software. I have made every reasonable attempt to correct obvious errors, but I expect that there may be errors of grammar and possibly content that I did not discover prior finalizing this note.  DALTON Pope.

## 2020-10-09 ENCOUNTER — OFFICE VISIT (OUTPATIENT)
Dept: OBGYN | Facility: CLINIC | Age: 35
End: 2020-10-09
Payer: COMMERCIAL

## 2020-10-09 DIAGNOSIS — O92.70 LACTATION PROBLEM: ICD-10-CM

## 2020-10-09 PROCEDURE — 99215 OFFICE O/P EST HI 40 MIN: CPT | Performed by: NURSE PRACTITIONER

## 2020-10-09 NOTE — PROGRESS NOTES
Summary: Infant growth did not increase in percentiles. Baby is taking 4oz in bottle from grandma more easily, childcare working on it.  Continues with block feeding, nurses without difficulty. Had just fed and pumped so did not do a weighted feed, he did go to the breast for comfort and took 6ml. Going forward, will pump off first 2-3oz and use remaining 5oz for feeds to increase calories. Moms diet is appropriate, quality of milk not impacted. Will do sick visit with ped next week.   Subjective:   Lisa Watters is a 35 y.o. female here to follow up lactation care. She is here today with baby Giovanni.    Concerns:   Infant weight check for growth slowed    HPI:   Pertinent  history:   Mother does not have a history of advanced maternal age, GDM, hypertension prior to pregnancy, insulin resistance, multiple gestation, PCOS and thyroid disease. These are common conditions which may interfere in establishing milk supply.    FEEDING HISTORY:    Past breastfeeding history:  First baby  but oversupply and choking throughout, one sided nursing, a lot of work, refused a bottle.    Hospital course: latched well  Prior to  visit: Nursing every 1-3, demand, around the clock. One side at a time, occasionally uses haakaa, no pumping yet, has Medela PNS.  Left nipple sore, cracked  Prior to  visit. nursing one side each feeding, grandma bottle feeding when watching him and  was 2oz but has increased in the last 2 weeks.  Currently Baby is taking 4oz in bottle from grandma more easily, childcare working on it.  Continues with block feeding, nurses without difficulty.Still spits up but not in pain, happy and content  Both breasts: No   Bottle feeds: While mom works  Supplement: None    Nipple Shield Use: None      Breast Pumping:    Type of pump: Medela PNS      ROS:  Constitutional:   no fever, chills. Feeling well  Extremities Swelling: No extremity swelling  Gastrointestinal:  Negative  for nausea, vomiting  Breasts: No soreness of breasts and Soreness of nipples  Psychiatric: Managing ok  Mental Health:  No mention of feeling irritable, agitated, angry, overwhelmed, apathy, exhaustion nor having sleep changes outside infant feeds/demands or appetite changes       Objective:     General: no acute distress  Neurological:  Alert and oriented x3  Breasts: Soft, Plugged Duct - no evidence and Mastitis  - no S/S right side observed  Nipples: intact right side bserved  Psychiatric:  Normal mood and affect. Her behavior is normal. Judgment and thought content normal   Mental Health:  Did NOT exhibit sadness, crying, feeling overwhelmed, agitation or hypervigilance.       Assessment/Plan & Lactation Counseling:     Infant Weight History:   7/8/20 8#6.0oz  7/10/20 8#6/0oz  7/22/20 8#11oz  7/30/20 9#4.0oz  56%  8/17/20: 10#4.4oz  40%  9/16/20: 11#4.6oz 15%  9/22/2011#4.6oz 10.47%  9/25/20 11#10.7oz 13.57%   10/09/2020: 12#0.1oz 8.4%      Infant intake at Breast:: had fed earlier and mom pumped. 6ml for comfort  Initiation of Feeding: Infant initiates  Position of Feeding:    Right: laid back  Attachment Achieved: rapidly  Nipple shield: N/A   Suck Pattern at the breast: Suck burst and normal rest, more non nutritive, mom had a TIP, baby nursed through but only 6ml.  Seems shallow but no pain, does take sufficient amount of milk in mouth    Behavior Following Observed Feeding: content,  Nipple Pain from: shallow latch left side and nipple has not been able to heal     Latch: Mom latches independently  Suckling/Feeding: attaches, baby roots, elicits TIP and rhythmic  Milk Supply Available: abundant supply, sucking,chocking and one sided nursing support hyperlactation    Diagnosis/Problem  Hyperlactation  Lactation Problem: Infant growth slowed exclusively breastfeeding/breastmilk        Discussed concerns and symptoms as listed above in assessment and guidance summarized below.  Topics reviewed included:    • Infant oral/structure: Observed Good neck movement,  Tongue lifting 80% to palate, lateralizes well, not interested in cupping on finger, tongue extends to lip, no stop sign under the tongue, no visual frenulum  • Foremilk Hind milk imbalance This may be one reason infant weight change; in am offer 2 feedings on one side and maybe use haakaa for comfort. 3rd feeding start on other side x2 no haakaa. Attempt to get higher fat intake and decrease supply from 8-9oz total to 4-5oz   •  Feeding:Weight intake  indicates sufficient intake per feeding, spitting up reduces that total intake. Balancing more milk in to reduce supply for more available fat.   • Recommend if baby wants to eat a second time an hour or hour and a half after first feeding, stay on same breast.    When pumping.  Pump off first 2-3oz and use remaining 5oz for feeds to increase calories.   Supplement: No supplement is needed  Weight check in 2 weeks.       Mom expressed understanding, and asked appropriate questions        Follow-up for infant weight check and dyad breastfeeding evaluation as needed for weight to be followed when purposely decreasing supply.Please call [phone] if you have not scheduled your next appointment    A total of 55 minutes, this does not include infant assessment time, were spent face to face with more than 50% spent counseling and coordinating care as detailed in the above note.         PLEASE NOTE: Some of this note was created using voice recognition software. I have made every reasonable attempt to correct obvious errors, but I expect that there may be errors of grammar and possibly content that I did not discover prior finalizing this note.  Chapin Reid, [email or web address].

## 2020-11-05 ENCOUNTER — TELEPHONE (OUTPATIENT)
Dept: HEALTH INFORMATION MANAGEMENT | Facility: OTHER | Age: 35
End: 2020-11-05

## 2020-11-19 ENCOUNTER — TELEMEDICINE (OUTPATIENT)
Dept: OBGYN | Facility: CLINIC | Age: 35
End: 2020-11-19
Payer: COMMERCIAL

## 2020-11-19 DIAGNOSIS — O92.70 LACTATION PROBLEM: ICD-10-CM

## 2020-11-19 PROCEDURE — 99214 OFFICE O/P EST MOD 30 MIN: CPT | Mod: 95,CR | Performed by: NURSE PRACTITIONER

## 2020-11-19 NOTE — PROGRESS NOTES
Telemedicine: Established Patient   This evaluation was conducted via Zoom using secure and encrypted videoconferencing technology. The patient was in a private location in the state of [place].    The patient's identity was confirmed and verbal consent was obtained for this virtual visit. Parent zoom cutting in and out so finished on the phone.    Subjective:   CC: Lactation problem infant slow growth  Chief Complaint   Patient presents with   • Lactation Services       Lisa Watters is a 35 y.o. female presenting for evaluation and management of her infants slow growth:      ROS   ROS:  Constitutional: Well nourished, no acute distress, no fever, chills. Feeling well  Breasts: No soreness  of breasts, nipples.   Psychiatric: No mood changes not experiencing anxiety, feels exhausted, overwhelmed, managing ok.         Allergies   Allergen Reactions   • Sulfa Drugs Rash     Total body rash X 1       Current medicines (including changes today)  Current Outpatient Medications   Medication Sig Dispense Refill   • Prenatal Vit-Fe Fumarate-FA (PRENATAL MULTIVITAMIN/IRON PO) PRENATAL VITAMIN TABS     • triamcinolone acetonide (KENALOG) 0.1 % Cream Apply to affected area twice daily for up to 10 days 30 g 0   • ibuprofen (MOTRIN) 600 MG Tab Take 1 Tab by mouth every 6 hours as needed (For cramping after delivery; do not give if patient is receiving ketorolac (Toradol)). 30 Tab    • docusate sodium 100 MG Cap Take 100 mg by mouth 2 times a day as needed for Constipation. 60 Cap    • Prenatal MV-Min-Fe Fum-FA-DHA (PRENATAL 1 PO) Take  by mouth.       No current facility-administered medications for this visit.        Patient Active Problem List    Diagnosis Date Noted   • Lactation problem, baby slow growth exclusive breastfeeding 09/25/2020   • Hyperlactation 07/30/2020       History reviewed. No pertinent family history.    She  has no past medical history on file.  She  has no past surgical history on  file.       Objective:   There were no vitals taken for this visit.    Physical Exam     General:  no acute distress  Neurological:  Alert and oriented x3  Psychiatric: Normal mood and affect. Her behavior is normal. Judgment and thought content normal       Assessment and Plan:   The following treatment plan was discussed:     1. Lactation problem, baby slow growth exclusive breastfeeding    Infant weight Mom has been following juanis Davila's weight closely and the growth chart has trended down after 6 weeks. He seems to settle between 7-12%ile. Today he was at the 10%ile.   Baby eats 5-6 times per day, mom offers more often, offers both breasts, he self regulates.  Baby gets hind milk milk at least one bottle at day care  Baby is happy and healthy appearing at last ped visit.  Baby Giovanni is like his brother, lean (10%) and tall (50%)  Will not implement any other changes, but mom will watch weight weekly and report so trend can be observed.    Vit D Mom finds she is not consistent giving him Vit D. Discussed need for Vit D but it is not why baby's weight has trended down.  Nighttime feeding Mom is feeding every 2-4 hours at night. Suggested dad cover the 11pm feeding and mom got to bed after last feeding. Mom does not want to drop a night feeding due to growth, but needs sleep. Discussed after consistent 10% weights, offering 4oz bottles at night and maybe drop a night feeding.  Mom fully aware baby needs to make it up somewhere else in the day. Discussed' breastsleeping' safety  Murmer addressed In ped evaluation murmer heard and cardiologist cleared so weight is not a reflection of cardiac issue  No diet changes Discussed maternal diet and lack of impact on manipulating fats in moms milk.  Eat healthy for moms nutrition and health, milk will take all the good nutrients for breastmilk. Can't manipulate breastmilk.     Follow-up: Return if symptoms worsen or fail to improve, for For lactation concerns.     I spent 35  min face to face more than 50% of the time was spent on counseling and coordinating care as detailed in the above note.

## 2020-11-23 ENCOUNTER — OFFICE VISIT (OUTPATIENT)
Dept: MEDICAL GROUP | Facility: MEDICAL CENTER | Age: 35
End: 2020-11-23
Payer: COMMERCIAL

## 2020-11-23 VITALS
WEIGHT: 135 LBS | SYSTOLIC BLOOD PRESSURE: 102 MMHG | DIASTOLIC BLOOD PRESSURE: 66 MMHG | HEIGHT: 65 IN | BODY MASS INDEX: 22.49 KG/M2 | TEMPERATURE: 97.1 F | OXYGEN SATURATION: 96 % | HEART RATE: 67 BPM

## 2020-11-23 DIAGNOSIS — R19.00 ABDOMINAL WALL BULGE: ICD-10-CM

## 2020-11-23 DIAGNOSIS — N39.41 URGE INCONTINENCE: ICD-10-CM

## 2020-11-23 PROCEDURE — 99214 OFFICE O/P EST MOD 30 MIN: CPT | Performed by: FAMILY MEDICINE

## 2020-11-23 ASSESSMENT — PATIENT HEALTH QUESTIONNAIRE - PHQ9: CLINICAL INTERPRETATION OF PHQ2 SCORE: 0

## 2020-11-24 NOTE — PROGRESS NOTES
Subjective:     CC: Diagnoses of Urge incontinence and Abdominal wall bulge were pertinent to this visit.    HPI: Patient is a 35 y.o. female new patient who presents today to establish care.      Urge incontinence  New problem.  The patient is currently 4 months postpartum.  Having some urge incontinence still.  She is breast-feeding exclusively.  She is already doing pelvic floor therapy with a specialized physical therapist.    Abdominal wall bulge  New problem.  The patient reports that she has what appears to be a small defect on her right mid abdomen in a horizontal fashion and just underneath is a small bulge.  This is not bothersome, not painful.  She is 4 months postpartum with her second child.  Her first child is 2 years old.  She has Apsley no other symptoms, not tender to palpation.  She does not feel defect.      History reviewed. No pertinent past medical history.    Social History     Tobacco Use   • Smoking status: Never Smoker   • Smokeless tobacco: Never Used   Substance Use Topics   • Alcohol use: Yes     Comment: rare   • Drug use: No       Current Outpatient Medications Ordered in Epic   Medication Sig Dispense Refill   • Prenatal Vit-Fe Fumarate-FA (PRENATAL VITAMIN PO) Take  by mouth.     • Prenatal Vit-Fe Fumarate-FA (PRENATAL MULTIVITAMIN/IRON PO) PRENATAL VITAMIN TABS     • Prenatal MV-Min-Fe Fum-FA-DHA (PRENATAL 1 PO) Take  by mouth.       No current Epic-ordered facility-administered medications on file.        Allergies:  Sulfa drugs    Health Maintenance: Completed    ROS:  Gen: no fevers/chill, no changes in weight  Eyes: no changes in vision  ENT: no sore throat, no hearing loss, no bloody nose  Pulm: no sob, no cough  CV: no chest pain, no palpitations  GI: no nausea/vomiting, no diarrhea  : no dysuria  MSk: no myalgias  Skin: no rash  Neuro: no headaches, no numbness/tingling  Heme/Lymph: no easy bruising      Objective:       Exam:  /66 (BP Location: Left arm, Patient  "Position: Sitting, BP Cuff Size: Adult long)   Pulse 67   Temp 36.2 °C (97.1 °F) (Temporal)   Ht 1.651 m (5' 5\")   Wt 61.2 kg (135 lb)   SpO2 96%   Breastfeeding Yes   BMI 22.47 kg/m²  Body mass index is 22.47 kg/m².      General: Normal appearing. No distress.  HEAD: NCAT  EYES: conjunctiva clear, lids without ptosis, pupils equal  and reactive to light  EARS: ears normal shape and contour, canals are clear bilaterally, TMs clear  MOUTH: Mask in place throughout exam  Neck: Supple without masses. Thyroid is not enlarged. Normal ROM  Pulmonary: Clear to ausculation.  Normal effort. No rales, ronchi, or wheezing.  Cardiovascular: Regular rate and rhythm, no murmur. No LE edema  Abdomen: Soft, nontender, small very slight bulge noted in the right mid abdomen superior to the umbilicus, soft, likely fatty tissue, no abdominal wall defect, no change with increased intra-abdominal pressure.  Neurologic: Grossly normal, no focal deficits  Lymph: No cervical or supraclavicular lymph nodes are palpable  Skin: Warm and dry.  No obvious lesions.  Musculoskeletal: Normal gait and station.   Psych: Normal mood and affect. Alert and oriented x3. Judgment and insight is normal.    Labs: 7/9/2020 results reviewed and discussed with the patient, questions answered.    Assessment & Plan:     35 y.o. female with the following -     1. Urge incontinence  New to discuss.  The patient is already taking part in pelvic floor physical therapy.  Advised she might also noticed some improvement once she weans as her estrogen increases and vaginal tissues pump backup.  We will continue to monitor.    2. Abdominal wall bulge  New problem.  The patient does have a very slight bulge in the right mid abdomen superior to the umbilicus, likely fatty tissue, no defect noted in the abdominal wall, no change in bulge with increased intra-abdominal pressure.  Advised to continue to monitor as she is only 4 months postpartum and this may improve on " its own.      Return if symptoms worsen or fail to improve.    Please note that this dictation was created using voice recognition software. I have made every reasonable attempt to correct obvious errors, but I expect that there are errors of grammar and possibly content that I did not discover before finalizing the note.

## 2020-11-24 NOTE — ASSESSMENT & PLAN NOTE
New problem.  The patient reports that she has what appears to be a small defect on her right mid abdomen in a horizontal fashion and just underneath is a small bulge.  This is not bothersome, not painful.  She is 4 months postpartum with her second child.  Her first child is 2 years old.  She has Apsley no other symptoms, not tender to palpation.  She does not feel defect.

## 2020-11-24 NOTE — ASSESSMENT & PLAN NOTE
New problem.  The patient is currently 4 months postpartum.  Having some urge incontinence still.  She is breast-feeding exclusively.  She is already doing pelvic floor therapy with a specialized physical therapist.

## 2021-04-08 ENCOUNTER — OFFICE VISIT (OUTPATIENT)
Dept: MEDICAL GROUP | Facility: MEDICAL CENTER | Age: 36
End: 2021-04-08
Payer: COMMERCIAL

## 2021-04-08 VITALS
HEART RATE: 70 BPM | TEMPERATURE: 97.3 F | SYSTOLIC BLOOD PRESSURE: 104 MMHG | DIASTOLIC BLOOD PRESSURE: 62 MMHG | HEIGHT: 65 IN | WEIGHT: 129 LBS | OXYGEN SATURATION: 94 % | BODY MASS INDEX: 21.49 KG/M2

## 2021-04-08 DIAGNOSIS — R76.8 ANTI-TPO ANTIBODIES PRESENT: ICD-10-CM

## 2021-04-08 PROCEDURE — 99213 OFFICE O/P EST LOW 20 MIN: CPT | Performed by: FAMILY MEDICINE

## 2021-04-08 ASSESSMENT — PATIENT HEALTH QUESTIONNAIRE - PHQ9: CLINICAL INTERPRETATION OF PHQ2 SCORE: 0

## 2021-04-08 NOTE — ASSESSMENT & PLAN NOTE
Took a test that she ordered online and was found to have anti TPO abs. Last month.   Completely asymtpomatic.

## 2021-04-08 NOTE — PROGRESS NOTES
"Subjective:     CC: The encounter diagnosis was Anti-TPO antibodies present.    HPI: Patient is a 35 y.o. female established patient who presents today with the following concern.       Anti-TPO antibodies present  New problem.  Took a test that she ordered online and was found to have anti TPO abs. Last month.  Her TSH, T3 and T4 were within normal limits.  She is 9 months postpartum.  Completely asymtpomatic aside from some mild intermittent constipation.  Otherwise blood pressure is normal, weight is normal with a BMI of 21.      No past medical history on file.    Social History     Tobacco Use   • Smoking status: Never Smoker   • Smokeless tobacco: Never Used   Substance Use Topics   • Alcohol use: Yes     Comment: rare   • Drug use: No       Current Outpatient Medications Ordered in Epic   Medication Sig Dispense Refill   • Prenatal Vit-Fe Fumarate-FA (PRENATAL MULTIVITAMIN/IRON PO) PRENATAL VITAMIN TABS     • Prenatal MV-Min-Fe Fum-FA-DHA (PRENATAL 1 PO) Take  by mouth.     • Prenatal Vit-Fe Fumarate-FA (PRENATAL VITAMIN PO) Take  by mouth.       No current Epic-ordered facility-administered medications on file.       Allergies:  Sulfa drugs    Health Maintenance: Completed    ROS:  Pulm: no sob, no cough  CV: no chest pain, no palpitations      Objective:       Exam:  /62 (BP Location: Right arm, Patient Position: Sitting, BP Cuff Size: Adult long)   Pulse 70   Temp 36.3 °C (97.3 °F) (Temporal)   Ht 1.651 m (5' 5\")   Wt 58.5 kg (129 lb)   SpO2 94%   BMI 21.47 kg/m²  Body mass index is 21.47 kg/m².    General: Normal appearing. No distress.  HEAD: NCAT  EYES: conjunctiva clear, lids without ptosis, pupils equal and reactive to light  EARS: ears normal shape and contour.  MOUTH: Mask in place throughout exam.  Neck:  Normal ROM  Pulmonary: Normal effort. Normal respiratory rate.  Cardiovascular: Well perfused. No LE edema  Neurologic: Grossly normal, no focal deficits  Skin: Warm and dry.  No " obvious lesions.  Musculoskeletal: Normal gait and station.   Psych: Normal mood and affect. Alert and oriented x3. Judgment and insight is normal.    Labs: 3/24/2020 results reviewed and discussed with the patient, questions answered.    Assessment & Plan:     35 y.o. female with the following -     1. Anti-TPO antibodies present  New problem.  The patient reports that she ordered a test online that measured thyroid function, her TPO antibodies were positive, however, TSH, T4 and T3 were all normal.  Plan to retest to ensure that TPO antibodies are in fact positive.  Also ordered antithyroglobulin antibodies.  Advised even if she does have TPO antibodies that she may have normal thyroid functioning but is at increased risk of developing Hashimoto's.  We will discuss further once her labs have been done.  - TSH WITH REFLEX TO FT4; Future  - TSH; Future  - T3 FREE; Future  - FREE THYROXINE; Future  - THYROID PEROXIDASE  (TPO) AB; Future      Return if symptoms worsen or fail to improve.    Please note that this dictation was created using voice recognition software. I have made every reasonable attempt to correct obvious errors, but I expect that there are errors of grammar and possibly content that I did not discover before finalizing the note.

## 2021-04-28 ENCOUNTER — APPOINTMENT (RX ONLY)
Dept: URBAN - METROPOLITAN AREA CLINIC 4 | Facility: CLINIC | Age: 36
Setting detail: DERMATOLOGY
End: 2021-04-28

## 2021-04-28 DIAGNOSIS — Z71.89 OTHER SPECIFIED COUNSELING: ICD-10-CM

## 2021-04-28 DIAGNOSIS — L57.8 OTHER SKIN CHANGES DUE TO CHRONIC EXPOSURE TO NONIONIZING RADIATION: ICD-10-CM

## 2021-04-28 DIAGNOSIS — D18.0 HEMANGIOMA: ICD-10-CM

## 2021-04-28 DIAGNOSIS — L82.0 INFLAMED SEBORRHEIC KERATOSIS: ICD-10-CM

## 2021-04-28 DIAGNOSIS — L81.4 OTHER MELANIN HYPERPIGMENTATION: ICD-10-CM

## 2021-04-28 DIAGNOSIS — Z85.828 PERSONAL HISTORY OF OTHER MALIGNANT NEOPLASM OF SKIN: ICD-10-CM | Status: STABLE

## 2021-04-28 DIAGNOSIS — D22 MELANOCYTIC NEVI: ICD-10-CM

## 2021-04-28 DIAGNOSIS — I78.8 OTHER DISEASES OF CAPILLARIES: ICD-10-CM

## 2021-04-28 PROBLEM — D18.01 HEMANGIOMA OF SKIN AND SUBCUTANEOUS TISSUE: Status: ACTIVE | Noted: 2021-04-28

## 2021-04-28 PROBLEM — D22.5 MELANOCYTIC NEVI OF TRUNK: Status: ACTIVE | Noted: 2021-04-28

## 2021-04-28 PROCEDURE — ? LIQUID NITROGEN

## 2021-04-28 PROCEDURE — 99203 OFFICE O/P NEW LOW 30 MIN: CPT | Mod: 25

## 2021-04-28 PROCEDURE — ? SUNSCREEN TREATMENT REGIMEN

## 2021-04-28 PROCEDURE — 17110 DESTRUCTION B9 LES UP TO 14: CPT

## 2021-04-28 PROCEDURE — ? COUNSELING

## 2021-04-28 PROCEDURE — ? ADDITIONAL NOTES

## 2021-04-28 ASSESSMENT — LOCATION SIMPLE DESCRIPTION DERM
LOCATION SIMPLE: LEFT FOREHEAD
LOCATION SIMPLE: ABDOMEN
LOCATION SIMPLE: SUPERIOR FOREHEAD
LOCATION SIMPLE: LEFT CHEEK
LOCATION SIMPLE: RIGHT FOREHEAD
LOCATION SIMPLE: FRONTAL SCALP
LOCATION SIMPLE: RIGHT HAND
LOCATION SIMPLE: CHEST
LOCATION SIMPLE: LEFT HAND
LOCATION SIMPLE: LEFT SCALP

## 2021-04-28 ASSESSMENT — LOCATION ZONE DERM
LOCATION ZONE: FACE
LOCATION ZONE: TRUNK
LOCATION ZONE: HAND
LOCATION ZONE: SCALP

## 2021-04-28 ASSESSMENT — LOCATION DETAILED DESCRIPTION DERM
LOCATION DETAILED: LEFT ULNAR DORSAL HAND
LOCATION DETAILED: PERIUMBILICAL SKIN
LOCATION DETAILED: RIGHT MEDIAL FOREHEAD
LOCATION DETAILED: RIGHT INFERIOR FOREHEAD
LOCATION DETAILED: RIGHT RADIAL DORSAL HAND
LOCATION DETAILED: EPIGASTRIC SKIN
LOCATION DETAILED: LEFT INFERIOR MEDIAL MALAR CHEEK
LOCATION DETAILED: LEFT MEDIAL MALAR CHEEK
LOCATION DETAILED: MEDIAL FRONTAL SCALP
LOCATION DETAILED: SUPERIOR MID FOREHEAD
LOCATION DETAILED: LEFT CENTRAL FRONTAL SCALP
LOCATION DETAILED: RIGHT MEDIAL SUPERIOR CHEST
LOCATION DETAILED: LEFT INFERIOR FOREHEAD
LOCATION DETAILED: LEFT RADIAL DORSAL HAND

## 2021-04-28 NOTE — PROCEDURE: LIQUID NITROGEN
Render Post-Care Instructions In Note?: no
Number Of Freeze-Thaw Cycles: 3 freeze-thaw cycles
Medical Necessity Clause: This procedure was medically necessary because the lesions that were treated were:
Duration Of Freeze Thaw-Cycle (Seconds): 10-15
Detail Level: Detailed
Post-Care Instructions: I reviewed with the patient in detail post-care instructions. Patient is to wear sunprotection, and avoid picking at any of the treated lesions. Pt may apply Vaseline to crusted or scabbing areas.
Consent: The patient's consent was obtained including but not limited to risks of crusting, scabbing, blistering, scarring, darker or lighter pigmentary change, recurrence, incomplete removal and infection.
Medical Necessity Information: It is in your best interest to select a reason for this procedure from the list below. All of these items fulfill various CMS LCD requirements except the new and changing color options.

## 2021-04-28 NOTE — PROCEDURE: ADDITIONAL NOTES
Render Risk Assessment In Note?: no
Additional Notes: PDT was discussed, will re-discuss next visit
Detail Level: Simple

## 2021-06-03 ENCOUNTER — APPOINTMENT (RX ONLY)
Dept: URBAN - METROPOLITAN AREA CLINIC 4 | Facility: CLINIC | Age: 36
Setting detail: DERMATOLOGY
End: 2021-06-03

## 2021-06-03 DIAGNOSIS — L82.0 INFLAMED SEBORRHEIC KERATOSIS: ICD-10-CM

## 2021-06-03 DIAGNOSIS — Z85.828 PERSONAL HISTORY OF OTHER MALIGNANT NEOPLASM OF SKIN: ICD-10-CM

## 2021-06-03 PROBLEM — D23.62 OTHER BENIGN NEOPLASM OF SKIN OF LEFT UPPER LIMB, INCLUDING SHOULDER: Status: ACTIVE | Noted: 2021-06-03

## 2021-06-03 PROCEDURE — 17110 DESTRUCTION B9 LES UP TO 14: CPT

## 2021-06-03 PROCEDURE — ? LIQUID NITROGEN

## 2021-06-03 PROCEDURE — ? COUNSELING

## 2021-06-03 PROCEDURE — 99212 OFFICE O/P EST SF 10 MIN: CPT | Mod: 25

## 2021-06-03 ASSESSMENT — LOCATION ZONE DERM
LOCATION ZONE: SCALP
LOCATION ZONE: FACE

## 2021-06-03 ASSESSMENT — LOCATION DETAILED DESCRIPTION DERM
LOCATION DETAILED: MEDIAL FRONTAL SCALP
LOCATION DETAILED: LEFT SUPERIOR FOREHEAD

## 2021-06-03 ASSESSMENT — LOCATION SIMPLE DESCRIPTION DERM
LOCATION SIMPLE: FRONTAL SCALP
LOCATION SIMPLE: LEFT FOREHEAD

## 2021-06-03 NOTE — PROCEDURE: LIQUID NITROGEN
Render Post-Care Instructions In Note?: no
Number Of Freeze-Thaw Cycles: 3 freeze-thaw cycles
Post-Care Instructions: I reviewed with the patient in detail post-care instructions. Patient is to wear sunprotection, and avoid picking at any of the treated lesions. Pt may apply Vaseline to crusted or scabbing areas.
Duration Of Freeze Thaw-Cycle (Seconds): 10-15
Medical Necessity Clause: This procedure was medically necessary because the lesions that were treated were:
Consent: The patient's consent was obtained including but not limited to risks of crusting, scabbing, blistering, scarring, darker or lighter pigmentary change, recurrence, incomplete removal and infection.
Medical Necessity Information: It is in your best interest to select a reason for this procedure from the list below. All of these items fulfill various CMS LCD requirements except the new and changing color options.
Detail Level: Detailed

## 2021-06-09 ENCOUNTER — APPOINTMENT (RX ONLY)
Dept: URBAN - METROPOLITAN AREA CLINIC 20 | Facility: CLINIC | Age: 36
Setting detail: DERMATOLOGY
End: 2021-06-09

## 2021-06-09 DIAGNOSIS — Z41.9 ENCOUNTER FOR PROCEDURE FOR PURPOSES OTHER THAN REMEDYING HEALTH STATE, UNSPECIFIED: ICD-10-CM

## 2021-06-09 PROCEDURE — ? ADDITIONAL NOTES

## 2021-06-09 NOTE — PROCEDURE: ADDITIONAL NOTES
Detail Level: Simple
Render Risk Assessment In Note?: no
Additional Notes: Patient is here for the treatment of a Mohs scar along her hairline, she recently had a baby and is having postpartum hair loss so she is seeing the scar more now. The scar is flat so it does not need to be Fraxeled. Tata suggested skin camouflage from [employer] to match the tissue damage white color. She does have a brown spot that will not go away and she has had BBL a couple times, it could be a possible AK. Tata showed her the Fraxel laser and how it can pretreat that and pull any more pigmentation she may have. She could spot treat the spot she is concerned with, the right temple. She is worried to get more basal cell and would like to do the full face to be proactive. She was quoted $600 verbally, she would like to get 1 treatment maybe annually. She does not have a history of cold sores.

## 2021-07-08 DIAGNOSIS — Z00.00 WELL ADULT EXAM: ICD-10-CM

## 2021-07-25 ENCOUNTER — HOSPITAL ENCOUNTER (OUTPATIENT)
Facility: MEDICAL CENTER | Age: 36
End: 2021-07-25
Attending: PHYSICIAN ASSISTANT
Payer: COMMERCIAL

## 2021-07-25 ENCOUNTER — OFFICE VISIT (OUTPATIENT)
Dept: URGENT CARE | Facility: CLINIC | Age: 36
End: 2021-07-25
Payer: COMMERCIAL

## 2021-07-25 VITALS
HEART RATE: 82 BPM | OXYGEN SATURATION: 98 % | RESPIRATION RATE: 14 BRPM | TEMPERATURE: 97.8 F | HEIGHT: 65 IN | DIASTOLIC BLOOD PRESSURE: 68 MMHG | WEIGHT: 130 LBS | SYSTOLIC BLOOD PRESSURE: 100 MMHG | BODY MASS INDEX: 21.66 KG/M2

## 2021-07-25 DIAGNOSIS — R30.0 DYSURIA: ICD-10-CM

## 2021-07-25 LAB
APPEARANCE UR: CLEAR
BILIRUB UR STRIP-MCNC: NEGATIVE MG/DL
COLOR UR AUTO: YELLOW
GLUCOSE UR STRIP.AUTO-MCNC: NEGATIVE MG/DL
KETONES UR STRIP.AUTO-MCNC: NEGATIVE MG/DL
LEUKOCYTE ESTERASE UR QL STRIP.AUTO: NEGATIVE
NITRITE UR QL STRIP.AUTO: NEGATIVE
PH UR STRIP.AUTO: 5.5 [PH] (ref 5–8)
PROT UR QL STRIP: NEGATIVE MG/DL
RBC UR QL AUTO: NEGATIVE
SP GR UR STRIP.AUTO: 1.02
UROBILINOGEN UR STRIP-MCNC: 0.2 MG/DL

## 2021-07-25 PROCEDURE — 81002 URINALYSIS NONAUTO W/O SCOPE: CPT | Performed by: PHYSICIAN ASSISTANT

## 2021-07-25 PROCEDURE — 99214 OFFICE O/P EST MOD 30 MIN: CPT | Performed by: PHYSICIAN ASSISTANT

## 2021-07-25 PROCEDURE — 87086 URINE CULTURE/COLONY COUNT: CPT

## 2021-07-25 RX ORDER — CEPHALEXIN 500 MG/1
500 CAPSULE ORAL 2 TIMES DAILY
Qty: 14 CAPSULE | Refills: 0 | Status: SHIPPED | OUTPATIENT
Start: 2021-07-25 | End: 2021-08-01

## 2021-07-25 ASSESSMENT — ENCOUNTER SYMPTOMS
DIARRHEA: 0
HEADACHES: 0
SHORTNESS OF BREATH: 0
COUGH: 0
MYALGIAS: 0
SORE THROAT: 0
NAUSEA: 0
VOMITING: 0
ABDOMINAL PAIN: 0
BACK PAIN: 1
CONSTIPATION: 0
FEVER: 0
CHILLS: 0
EYE PAIN: 0

## 2021-07-25 NOTE — PROGRESS NOTES
Subjective:   Lisa Watters is a 36 y.o. female who presents for UTI (3x days, lower back pain, burning )      HPI:  This is a pleasant 36-year-old female who presents complaining of 3 days of some mild urinary discomfort as well as some possibly related lumbar back pain.  Patient notes that she had her annual OB/GYN appointment with Pap smear as well as other testing done on Friday and shortly thereafter began to develop some mild dysuria without frequency urgency or hematuria.  She also had some low back pain around that time.  Today her symptoms seem to be much better with less urinary symptoms and the back pain has resolved, she wanted to make sure she does not have any urinary tract tract action.  She does not have any fevers or chills.  She is not experiencing any vaginal discharge.    Review of Systems   Constitutional: Negative for chills and fever.   HENT: Negative for congestion, ear pain and sore throat.    Eyes: Negative for pain.   Respiratory: Negative for cough and shortness of breath.    Cardiovascular: Negative for chest pain.   Gastrointestinal: Negative for abdominal pain, constipation, diarrhea, nausea and vomiting.   Genitourinary: Positive for dysuria.   Musculoskeletal: Positive for back pain. Negative for myalgias.   Skin: Negative for rash.   Neurological: Negative for headaches.       Medications:    • cephALEXin Caps  • PRENATAL 1 PO  • PRENATAL MULTIVITAMIN/IRON PO    Allergies: Sulfa drugs    Problem List: Lisa Watters does not have any pertinent problems on file.    Surgical History:  No past surgical history on file.    Past Social Hx: Lisa Watters  reports that she has never smoked. She has never used smokeless tobacco. She reports current alcohol use. She reports that she does not use drugs.     Past Family Hx:  Lisa Watters family history includes Cancer in her mother; No Known Problems in her father.     Problem list,  "medications, and allergies reviewed by myself today in Epic.     Objective:     /68 (BP Location: Left arm, Patient Position: Sitting, BP Cuff Size: Adult)   Pulse 82   Temp 36.6 °C (97.8 °F) (Temporal)   Resp 14   Ht 1.651 m (5' 5\")   Wt 59 kg (130 lb)   SpO2 98%   BMI 21.63 kg/m²     Physical Exam  Vitals reviewed.   Constitutional:       Appearance: Normal appearance.   HENT:      Head: Normocephalic and atraumatic.      Right Ear: External ear normal.      Left Ear: External ear normal.      Nose: Nose normal.      Mouth/Throat:      Mouth: Mucous membranes are moist.   Eyes:      Conjunctiva/sclera: Conjunctivae normal.   Cardiovascular:      Rate and Rhythm: Normal rate.   Pulmonary:      Effort: Pulmonary effort is normal.   Abdominal:      Tenderness: There is no right CVA tenderness or left CVA tenderness.   Skin:     General: Skin is warm and dry.      Capillary Refill: Capillary refill takes less than 2 seconds.   Neurological:      Mental Status: She is alert and oriented to person, place, and time.         Lab Results/POC Test Results   Results for orders placed or performed in visit on 07/25/21   POCT Urinalysis   Result Value Ref Range    POC Color YELLOW Negative    POC Appearance CLEAR Negative    POC Leukocyte Esterase NEGATIVE Negative    POC Nitrites NEGATIVE Negative    POC Urobiligen 0.2 Negative (0.2) mg/dL    POC Protein NEGATIVE Negative mg/dL    POC Urine PH 5.5 5.0 - 8.0    POC Blood NEGATIVE Negative    POC Specific Gravity 1.025 <1.005 - >1.030    POC Ketones NEGATIVE Negative mg/dL    POC Bilirubin NEGATIVE Negative mg/dL    POC Glucose NEGATIVE Negative mg/dL             Assessment/Plan:     Diagnosis and associated orders:     1. Dysuria  POCT Urinalysis    cephALEXin (KEFLEX) 500 MG Cap    URINE CULTURE(NEW)      Comments/MDM:     • UA fairly unremarkable, difficult to ascertain whether patient's symptoms are some vaginal irritation after recent pelvic exam or rather " represents a bacterial infection.  I agree to the patient that we will print out a prescription for Keflex (she is breast-feeding so I avoided Macrobid).  If she notices more overt UTI symptoms she may fill this prescription, otherwise we will perform a urine culture to look for bacterial causes.  Patient had more thorough testing done on with results pending at this time from her OB/GYN.  Patient demonstrated clear verbal understanding of instructions and agreed with this plan of care         Differential diagnosis, natural history, supportive care, and indications for immediate follow-up discussed.    Advised the patient to follow-up with the primary care physician for recheck, reevaluation, and consideration of further management.    Please note that this dictation was created using voice recognition software. I have made a reasonable attempt to correct obvious errors, but I expect that there are errors of grammar and possibly content that I did not discover before finalizing the note.    This note was electronically signed by Bryn Barraza PA-C

## 2021-07-28 LAB
BACTERIA UR CULT: NORMAL
SIGNIFICANT IND 70042: NORMAL
SITE SITE: NORMAL
SOURCE SOURCE: NORMAL

## 2021-09-21 ENCOUNTER — HOSPITAL ENCOUNTER (OUTPATIENT)
Dept: LAB | Facility: MEDICAL CENTER | Age: 36
End: 2021-09-21
Attending: FAMILY MEDICINE
Payer: COMMERCIAL

## 2021-09-21 DIAGNOSIS — Z00.00 WELL ADULT EXAM: ICD-10-CM

## 2021-09-21 DIAGNOSIS — R76.8 ANTI-TPO ANTIBODIES PRESENT: ICD-10-CM

## 2021-09-21 LAB
ALBUMIN SERPL BCP-MCNC: 4.9 G/DL (ref 3.2–4.9)
ALBUMIN/GLOB SERPL: 1.6 G/DL
ALP SERPL-CCNC: 57 U/L (ref 30–99)
ALT SERPL-CCNC: 8 U/L (ref 2–50)
ANION GAP SERPL CALC-SCNC: 12 MMOL/L (ref 7–16)
AST SERPL-CCNC: 14 U/L (ref 12–45)
BASOPHILS # BLD AUTO: 1 % (ref 0–1.8)
BASOPHILS # BLD: 0.04 K/UL (ref 0–0.12)
BILIRUB SERPL-MCNC: 0.5 MG/DL (ref 0.1–1.5)
BUN SERPL-MCNC: 12 MG/DL (ref 8–22)
CALCIUM SERPL-MCNC: 9.4 MG/DL (ref 8.5–10.5)
CHLORIDE SERPL-SCNC: 101 MMOL/L (ref 96–112)
CHOLEST SERPL-MCNC: 196 MG/DL (ref 100–199)
CO2 SERPL-SCNC: 24 MMOL/L (ref 20–33)
CREAT SERPL-MCNC: 0.64 MG/DL (ref 0.5–1.4)
EOSINOPHIL # BLD AUTO: 0.13 K/UL (ref 0–0.51)
EOSINOPHIL NFR BLD: 3.1 % (ref 0–6.9)
ERYTHROCYTE [DISTWIDTH] IN BLOOD BY AUTOMATED COUNT: 43.3 FL (ref 35.9–50)
FASTING STATUS PATIENT QL REPORTED: NORMAL
GLOBULIN SER CALC-MCNC: 3 G/DL (ref 1.9–3.5)
GLUCOSE SERPL-MCNC: 86 MG/DL (ref 65–99)
HCT VFR BLD AUTO: 42.1 % (ref 37–47)
HDLC SERPL-MCNC: 65 MG/DL
HGB BLD-MCNC: 14.3 G/DL (ref 12–16)
IMM GRANULOCYTES # BLD AUTO: 0 K/UL (ref 0–0.11)
IMM GRANULOCYTES NFR BLD AUTO: 0 % (ref 0–0.9)
LDLC SERPL CALC-MCNC: 113 MG/DL
LYMPHOCYTES # BLD AUTO: 1.52 K/UL (ref 1–4.8)
LYMPHOCYTES NFR BLD: 36.3 % (ref 22–41)
MCH RBC QN AUTO: 31 PG (ref 27–33)
MCHC RBC AUTO-ENTMCNC: 34 G/DL (ref 33.6–35)
MCV RBC AUTO: 91.1 FL (ref 81.4–97.8)
MONOCYTES # BLD AUTO: 0.33 K/UL (ref 0–0.85)
MONOCYTES NFR BLD AUTO: 7.9 % (ref 0–13.4)
NEUTROPHILS # BLD AUTO: 2.17 K/UL (ref 2–7.15)
NEUTROPHILS NFR BLD: 51.7 % (ref 44–72)
NRBC # BLD AUTO: 0 K/UL
NRBC BLD-RTO: 0 /100 WBC
PLATELET # BLD AUTO: 246 K/UL (ref 164–446)
PMV BLD AUTO: 10.2 FL (ref 9–12.9)
POTASSIUM SERPL-SCNC: 4.3 MMOL/L (ref 3.6–5.5)
PROT SERPL-MCNC: 7.9 G/DL (ref 6–8.2)
RBC # BLD AUTO: 4.62 M/UL (ref 4.2–5.4)
SODIUM SERPL-SCNC: 137 MMOL/L (ref 135–145)
T3FREE SERPL-MCNC: 2.88 PG/ML (ref 2–4.4)
T4 FREE SERPL-MCNC: 1.08 NG/DL (ref 0.93–1.7)
THYROPEROXIDASE AB SERPL-ACNC: <9 IU/ML (ref 0–9)
TRIGL SERPL-MCNC: 89 MG/DL (ref 0–149)
TSH SERPL DL<=0.005 MIU/L-ACNC: 2.54 UIU/ML (ref 0.38–5.33)
WBC # BLD AUTO: 4.2 K/UL (ref 4.8–10.8)

## 2021-09-21 PROCEDURE — 86376 MICROSOMAL ANTIBODY EACH: CPT

## 2021-09-21 PROCEDURE — 80053 COMPREHEN METABOLIC PANEL: CPT

## 2021-09-21 PROCEDURE — 84439 ASSAY OF FREE THYROXINE: CPT

## 2021-09-21 PROCEDURE — 36415 COLL VENOUS BLD VENIPUNCTURE: CPT

## 2021-09-21 PROCEDURE — 84443 ASSAY THYROID STIM HORMONE: CPT

## 2021-09-21 PROCEDURE — 85025 COMPLETE CBC W/AUTO DIFF WBC: CPT

## 2021-09-21 PROCEDURE — 84481 FREE ASSAY (FT-3): CPT

## 2021-09-21 PROCEDURE — 80061 LIPID PANEL: CPT

## 2021-10-15 ENCOUNTER — HOSPITAL ENCOUNTER (OUTPATIENT)
Facility: MEDICAL CENTER | Age: 36
End: 2021-10-15
Attending: PHYSICIAN ASSISTANT
Payer: COMMERCIAL

## 2021-10-15 ENCOUNTER — OFFICE VISIT (OUTPATIENT)
Dept: MEDICAL GROUP | Facility: MEDICAL CENTER | Age: 36
End: 2021-10-15
Payer: COMMERCIAL

## 2021-10-15 VITALS
TEMPERATURE: 97.3 F | DIASTOLIC BLOOD PRESSURE: 74 MMHG | WEIGHT: 132.6 LBS | OXYGEN SATURATION: 98 % | BODY MASS INDEX: 22.09 KG/M2 | SYSTOLIC BLOOD PRESSURE: 92 MMHG | HEIGHT: 65 IN | HEART RATE: 72 BPM

## 2021-10-15 DIAGNOSIS — N89.8 VAGINAL DISCHARGE: ICD-10-CM

## 2021-10-15 PROCEDURE — 87510 GARDNER VAG DNA DIR PROBE: CPT

## 2021-10-15 PROCEDURE — 87660 TRICHOMONAS VAGIN DIR PROBE: CPT

## 2021-10-15 PROCEDURE — 99213 OFFICE O/P EST LOW 20 MIN: CPT | Performed by: PHYSICIAN ASSISTANT

## 2021-10-15 PROCEDURE — 87480 CANDIDA DNA DIR PROBE: CPT

## 2021-10-15 RX ORDER — METRONIDAZOLE 7.5 MG/G
GEL VAGINAL
COMMUNITY
Start: 2021-07-30 | End: 2021-10-15

## 2021-10-15 ASSESSMENT — FIBROSIS 4 INDEX: FIB4 SCORE: 0.68

## 2021-10-15 NOTE — LETTER
[facility]  Shannan Marcano M.D.  [address]  [facility] [address]  Fax: [phone]   Authorization for Release/Disclosure of   Protected Health Information   Name: KATHERINE MORENO : 1985 SSN: [id number]   Address: [address] [place] [id number] Phone:    [phone] (home)    I authorize the entity listed below to release/disclose the PHI below to:   [facility]/Shannan Mracano M.D. and Georgia Mercer P.A.-C.   Provider or Entity Name: Sean Henderson D.O.         Address   City, [facility]    Phone:      Fax : [phone]      Reason for request: continuity of care   Information to be released:    [  ] LAST COLONOSCOPY,  including any PATH REPORT and follow-up  [  ] LAST FIT/COLOGUARD RESULT [  ] LAST DEXA  [  ] LAST MAMMOGRAM  [ xxx ] LAST PAP  [  ] LAST LABS [  ] RETINA EXAM REPORT  [  ] IMMUNIZATION RECORDS  [  ] Release all info      [  ] Check here and initial the line next to each item to release ALL health information INCLUDING  _____ Care and treatment for drug and / or alcohol abuse  _____ HIV testing, infection status, or AIDS  _____ Genetic Testing    DATES OF SERVICE OR TIME PERIOD TO BE DISCLOSED: _____________  I understand and acknowledge that:  * This Authorization may be revoked at any time by you in writing, except if your health information has already been used or disclosed.  * Your health information that will be used or disclosed as a result of you signing this authorization could be re-disclosed by the recipient. If this occurs, your re-disclosed health information may no longer be protected by State or Federal laws.  * You may refuse to sign this Authorization. Your refusal will not affect your ability to obtain treatment.  * This Authorization becomes effective upon signing and will  on (date) __________.      If no date is indicated, this Authorization will  one (1) year from the signature date.    Name: Katherine Littlejohn  Duong    Signature:   Date:     10/15/2021       PLEASE FAX REQUESTED RECORDS BACK TO: [phone]

## 2021-10-15 NOTE — PROGRESS NOTES
"Subjective     Lisa Watters is a 36 y.o. female who presents with Vaginitis (Feeling wetness, concerns of BV or yeast)          Chief Complaint   Patient presents with   • Vaginitis     Feeling wetness, concerns of BV or yeast       HPI  Patient presents for same day access due to concern with vaginal discharge (white/clear) associated with slight vaginal discomfort, no itching. New concern. In August she had similar symptoms and was treated by her GYN for BV. States symptoms for the most part got better and then recently returned. No pelvic pain. Not at risk for STD. Has 2 children, 3 y/o and 15 m/o. Not on birth control but using condoms. Up to date pap/pelvic. No other concerns today.    ROSno fever/chills. No weight change. No n/v/d/c or abdominal pain. No flank pain. No urinary symptoms. No rash or skin lesion.        Active Ambulatory Problems     Diagnosis Date Noted   • Hyperlactation 07/30/2020   • Lactation problem, baby slow growth exclusive breastfeeding 09/25/2020   • Urge incontinence 11/23/2020   • Abdominal wall bulge 11/23/2020   • Anti-TPO antibodies present 04/08/2021   • Vaginal discharge 10/15/2021     Resolved Ambulatory Problems     Diagnosis Date Noted   • Sore nipples due to lactation 07/30/2020     No Additional Past Medical History     Current Outpatient Medications   Medication Sig Dispense Refill   • Prenatal Vit-Fe Fumarate-FA (PRENATAL MULTIVITAMIN/IRON PO) PRENATAL VITAMIN TABS       No current facility-administered medications for this visit.   allergy sulfa  Non-smoker      Objective     BP (!) 92/74 (BP Location: Right arm, Patient Position: Sitting, BP Cuff Size: Adult)   Pulse 72   Temp 36.3 °C (97.3 °F) (Temporal)   Ht 1.651 m (5' 5\")   Wt 60.1 kg (132 lb 9.6 oz)   LMP 09/29/2021   SpO2 98%   Breastfeeding No Comment: Stopped 3 weeks ago  BMI 22.07 kg/m²      Physical Exam  Vitals and nursing note reviewed.   Constitutional:       General: She is not in " acute distress.     Appearance: Normal appearance. She is normal weight. She is not ill-appearing, toxic-appearing or diaphoretic.   Genitourinary:     General: Normal vulva.      Labia:         Right: No rash, tenderness, lesion or injury.         Left: No rash, tenderness, lesion or injury.       Vagina: Vaginal discharge present.      Cervix: Normal.      Comments: Thin/white discharge  Skin:     General: Skin is warm and dry.      Coloration: Skin is not pale.      Findings: No rash.   Neurological:      General: No focal deficit present.      Mental Status: She is alert and oriented to person, place, and time.   Psychiatric:         Mood and Affect: Mood normal.         Behavior: Behavior normal.         Thought Content: Thought content normal.         Judgment: Judgment normal.                             Assessment & Plan        1. Vaginal discharge    - VAGINAL PATHOGENS DNA PANEL; Future     will contact patient with results when available and treat if needed, otherwise patient will f/u with PCP and GYN

## 2021-10-15 NOTE — ASSESSMENT & PLAN NOTE
Wetness and a little pain around vaginal opening. No urinary symptoms. No pelvic pain. Feels like there is more discharge than normal.

## 2021-10-16 LAB
CANDIDA DNA VAG QL PROBE+SIG AMP: NEGATIVE
G VAGINALIS DNA VAG QL PROBE+SIG AMP: NEGATIVE
T VAGINALIS DNA VAG QL PROBE+SIG AMP: NEGATIVE

## 2022-02-04 ENCOUNTER — OFFICE VISIT (OUTPATIENT)
Dept: MEDICAL GROUP | Facility: MEDICAL CENTER | Age: 37
End: 2022-02-04
Payer: COMMERCIAL

## 2022-02-04 VITALS
OXYGEN SATURATION: 98 % | DIASTOLIC BLOOD PRESSURE: 62 MMHG | SYSTOLIC BLOOD PRESSURE: 120 MMHG | HEIGHT: 65 IN | WEIGHT: 129 LBS | TEMPERATURE: 97.5 F | BODY MASS INDEX: 21.49 KG/M2 | HEART RATE: 81 BPM

## 2022-02-04 DIAGNOSIS — R51.9 PRESSURE IN HEAD: ICD-10-CM

## 2022-02-04 PROCEDURE — 99213 OFFICE O/P EST LOW 20 MIN: CPT | Performed by: FAMILY MEDICINE

## 2022-02-04 RX ORDER — FLUTICASONE PROPIONATE 50 MCG
1 SPRAY, SUSPENSION (ML) NASAL 2 TIMES DAILY
Qty: 9.9 ML | Refills: 1 | Status: SHIPPED
Start: 2022-02-04 | End: 2022-04-12

## 2022-02-04 RX ORDER — IMIQUIMOD 12.5 MG/.25G
CREAM TOPICAL
COMMUNITY
Start: 2021-12-22 | End: 2022-02-04

## 2022-02-04 ASSESSMENT — FIBROSIS 4 INDEX: FIB4 SCORE: 0.68

## 2022-02-04 ASSESSMENT — PATIENT HEALTH QUESTIONNAIRE - PHQ9: CLINICAL INTERPRETATION OF PHQ2 SCORE: 0

## 2022-02-04 NOTE — PROGRESS NOTES
"Subjective:     CC: The encounter diagnosis was Pressure in head.    HPI: Patient is a 36 y.o. female established patient who presents today with the following concern.       Pressure in head  Present x 4 weeks  Worse over the past week  She did have COVID 3 weeks ago, feels that it maybe worsened her symptoms.   Feels the pressure toward the front of her head  No nasal congestion  BP normal, although higher than usual. However, recheck shows 100/70.  No vision change  No fevers  No numbness, weakness or tingling.       History reviewed. No pertinent past medical history.    Social History     Tobacco Use   • Smoking status: Never Smoker   • Smokeless tobacco: Never Used   Vaping Use   • Vaping Use: Never used   Substance Use Topics   • Alcohol use: Yes     Comment: rare   • Drug use: No       Current Outpatient Medications Ordered in Epic   Medication Sig Dispense Refill   • fluticasone (FLONASE) 50 MCG/ACT nasal spray Administer 1 Spray into affected nostril(S) 2 times a day. 9.9 mL 1   • Prenatal Vit-Fe Fumarate-FA (PRENATAL MULTIVITAMIN/IRON PO) PRENATAL VITAMIN TABS       No current Epic-ordered facility-administered medications on file.       Allergies:  Sulfa drugs    Health Maintenance: Completed        Objective:       Exam:  /62 (BP Location: Right arm, Patient Position: Sitting, BP Cuff Size: Adult long)   Pulse 81   Temp 36.4 °C (97.5 °F) (Temporal)   Ht 1.651 m (5' 5\")   Wt 58.5 kg (129 lb)   SpO2 98%   BMI 21.47 kg/m²  Body mass index is 21.47 kg/m².      General: Normal appearing. No distress.  HEAD: NCAT  EYES: conjunctiva clear, lids without ptosis, pupils equal  and reactive to light  EARS: ears normal shape and contour, canals are clear bilaterally, TMs clear  MOUTH: oropharynx is without erythema, edema or exudates.   Neck: Supple without masses. Thyroid is not enlarged. Normal ROM  Pulmonary: Clear to ausculation.  Normal effort. No rales, ronchi, or wheezing.  Cardiovascular: " Regular rate and rhythm, no murmur. No LE edema  Neurologic: Grossly normal, no focal deficits  Lymph: No cervical or supraclavicular lymph nodes are palpable  Skin: Warm and dry.  No obvious lesions.  Musculoskeletal: Normal gait and station.   Psych: Normal mood and affect. Alert and oriented x3. Judgment and insight is normal.      Labs: 9/21/21 Results reviewed and discussed with the patient, questions answered.    Assessment & Plan:     36 y.o. female with the following -     1. Pressure in head  New problem.  Present for 3 to 4 weeks now.  She did have Covid 3 weeks ago, likely related to that.  She had a completely normal exam today.  No tenderness to palpation.  She has [place] no neurological symptoms associated with this pressure.  Plan to try Flonase to see if it could be sinus related.  Follow-up in a few weeks.  If symptoms resolve entirely no need for follow-up.  We did review red flag symptoms and ER precautions.  - fluticasone (FLONASE) 50 MCG/ACT nasal spray; Administer 1 Spray into affected nostril(S) 2 times a day.  Dispense: 9.9 mL; Refill: 1      No follow-ups on file.    Please note that this dictation was created using voice recognition software. I have made every reasonable attempt to correct obvious errors, but I expect that there are errors of grammar and possibly content that I did not discover before finalizing the note.

## 2022-03-22 NOTE — ASSESSMENT & PLAN NOTE
Present x 4 weeks  Worse over the past week  She did have COVID 3 weeks ago, feels that it maybe worsened her symptoms.   Feels toward the front of her head  No nasal congestion  BP normal, although higher than usual.   No vision change  No fevers  No numbness, weakness or tingling.   
Detail Level: Simple
Additional Notes: Patient consent was obtained to proceed with the visit and recommended plan of care after discussion of all risks and benefits, including the risks of COVID-19 exposure.

## 2022-04-12 ENCOUNTER — OFFICE VISIT (OUTPATIENT)
Dept: MEDICAL GROUP | Facility: MEDICAL CENTER | Age: 37
End: 2022-04-12
Payer: COMMERCIAL

## 2022-04-12 VITALS
HEIGHT: 65 IN | OXYGEN SATURATION: 97 % | SYSTOLIC BLOOD PRESSURE: 118 MMHG | WEIGHT: 133 LBS | BODY MASS INDEX: 22.16 KG/M2 | DIASTOLIC BLOOD PRESSURE: 72 MMHG | TEMPERATURE: 97.7 F | HEART RATE: 85 BPM

## 2022-04-12 DIAGNOSIS — L70.0 ACNE VULGARIS: ICD-10-CM

## 2022-04-12 DIAGNOSIS — R76.8 ANTI-TPO ANTIBODIES PRESENT: ICD-10-CM

## 2022-04-12 DIAGNOSIS — Z13.220 LIPID SCREENING: ICD-10-CM

## 2022-04-12 DIAGNOSIS — Z30.09 FAMILY PLANNING: ICD-10-CM

## 2022-04-12 DIAGNOSIS — N92.0 MENORRHAGIA WITH REGULAR CYCLE: ICD-10-CM

## 2022-04-12 DIAGNOSIS — Z00.00 WELL ADULT EXAM: ICD-10-CM

## 2022-04-12 PROBLEM — R51.9 PRESSURE IN HEAD: Status: RESOLVED | Noted: 2022-02-04 | Resolved: 2022-04-12

## 2022-04-12 PROBLEM — N89.8 VAGINAL DISCHARGE: Status: RESOLVED | Noted: 2021-10-15 | Resolved: 2022-04-12

## 2022-04-12 PROBLEM — N39.41 URGE INCONTINENCE: Status: RESOLVED | Noted: 2020-11-23 | Resolved: 2022-04-12

## 2022-04-12 PROCEDURE — 99214 OFFICE O/P EST MOD 30 MIN: CPT | Performed by: FAMILY MEDICINE

## 2022-04-12 RX ORDER — IMIQUIMOD 12.5 MG/.25G
CREAM TOPICAL
COMMUNITY
Start: 2022-04-05 | End: 2023-03-04

## 2022-04-12 ASSESSMENT — FIBROSIS 4 INDEX: FIB4 SCORE: 0.68

## 2022-04-12 NOTE — ASSESSMENT & PLAN NOTE
The patient is thinking about adding to her family.   Requesting labs  Reports regular periods  No longer breast feeding.   Children are 4 and 2.

## 2022-04-12 NOTE — ASSESSMENT & PLAN NOTE
Having regular periods. Heavier than previously but with normal interval. Not on any form of birth control currently.

## 2022-04-12 NOTE — PROGRESS NOTES
"Subjective:     CC: Diagnoses of Acne vulgaris, Lipid screening, Anti-TPO antibodies present, Menorrhagia with regular cycle, Well adult exam, and Family planning were pertinent to this visit.    HPI: Patient is a 36 y.o. female established patient who presents today discuss the following.       Acne vulgaris  Having regular periods. Heavier than previously but with normal interval. Not on any form of birth control currently.     Anti-TPO antibodies present  Chronic problem. Patient has h/o anti TPO ab present. Asymptomatic. TSH normal in the past.     Family planning    The patient is thinking about adding to her family.   Requesting labs  Reports regular periods  No longer breast feeding.   Children are 4 and 2.       History reviewed. No pertinent past medical history.    Social History     Tobacco Use   • Smoking status: Never Smoker   • Smokeless tobacco: Never Used   Vaping Use   • Vaping Use: Never used   Substance Use Topics   • Alcohol use: Yes     Comment: rare   • Drug use: No       Current Outpatient Medications Ordered in Epic   Medication Sig Dispense Refill   • imiquimod (ALDARA) 5 % cream PLEASE SEE ATTACHED FOR DETAILED DIRECTIONS     • Prenatal Vit-Fe Fumarate-FA (PRENATAL MULTIVITAMIN/IRON PO) PRENATAL VITAMIN TABS       No current Epic-ordered facility-administered medications on file.       Allergies:  Sulfa drugs    Health Maintenance: Completed      Objective:       Exam:  /72 (BP Location: Left arm, Patient Position: Sitting, BP Cuff Size: Adult long)   Pulse 85   Temp 36.5 °C (97.7 °F) (Temporal)   Ht 1.651 m (5' 5\")   Wt 60.3 kg (133 lb)   SpO2 97%   BMI 22.13 kg/m²  Body mass index is 22.13 kg/m².      General: Normal appearing. No distress.  HEAD: NCAT  EYES: conjunctiva clear, lids without ptosis, pupils equal  and reactive to light  EARS: ears normal shape and contour, canals are clear bilaterally, TMs clear  Neck: Supple without masses. Thyroid is not enlarged. Normal " ROM  Pulmonary: Clear to ausculation.  Normal effort. No rales, ronchi, or wheezing.  Cardiovascular: Regular rate and rhythm, no murmur. No LE edema  Neurologic: Grossly normal, no focal deficits  Lymph: No cervical or supraclavicular lymph nodes are palpable  Skin: Warm and dry.  No obvious lesions.  Musculoskeletal: Normal gait and station.   Psych: Normal mood and affect. Alert and oriented x3. Judgment and insight is normal.      Labs: 9/21/21 Results reviewed and discussed with the patient, questions answered.    Assessment & Plan:     36 y.o. female with the following -     1. Acne vulgaris  New problem. Skin looked well on exam today. No need for intervention today. Labs ordered.     2. Lipid screening  - Lipid Profile; Future    3. Anti-TPO antibodies present  Chronic problem. TSH ordered.   - TSH WITH REFLEX TO FT4; Future    4. Menorrhagia with regular cycle  New problem. Likely within normal limits. Normal cycle. CBC ordered.   - CBC WITH DIFFERENTIAL; Future    5. Well adult exam  - Comp Metabolic Panel; Future  - VITAMIN D,25 HYDROXY; Future      Return if symptoms worsen or fail to improve.    Please note that this dictation was created using voice recognition software. I have made every reasonable attempt to correct obvious errors, but I expect that there are errors of grammar and possibly content that I did not discover before finalizing the note.

## 2022-05-20 ENCOUNTER — HOSPITAL ENCOUNTER (OUTPATIENT)
Dept: LAB | Facility: MEDICAL CENTER | Age: 37
End: 2022-05-20
Attending: FAMILY MEDICINE
Payer: COMMERCIAL

## 2022-05-20 DIAGNOSIS — Z13.220 LIPID SCREENING: ICD-10-CM

## 2022-05-20 DIAGNOSIS — N92.0 MENORRHAGIA WITH REGULAR CYCLE: ICD-10-CM

## 2022-05-20 DIAGNOSIS — R76.8 ANTI-TPO ANTIBODIES PRESENT: ICD-10-CM

## 2022-05-20 DIAGNOSIS — Z00.00 WELL ADULT EXAM: ICD-10-CM

## 2022-05-20 LAB
ALBUMIN SERPL BCP-MCNC: 4.8 G/DL (ref 3.2–4.9)
ALBUMIN/GLOB SERPL: 1.7 G/DL
ALP SERPL-CCNC: 38 U/L (ref 30–99)
ALT SERPL-CCNC: 8 U/L (ref 2–50)
ANION GAP SERPL CALC-SCNC: 10 MMOL/L (ref 7–16)
AST SERPL-CCNC: 12 U/L (ref 12–45)
BASOPHILS # BLD AUTO: 0.8 % (ref 0–1.8)
BASOPHILS # BLD: 0.03 K/UL (ref 0–0.12)
BILIRUB SERPL-MCNC: 0.5 MG/DL (ref 0.1–1.5)
BUN SERPL-MCNC: 18 MG/DL (ref 8–22)
CALCIUM SERPL-MCNC: 9.3 MG/DL (ref 8.5–10.5)
CHLORIDE SERPL-SCNC: 104 MMOL/L (ref 96–112)
CHOLEST SERPL-MCNC: 191 MG/DL (ref 100–199)
CO2 SERPL-SCNC: 24 MMOL/L (ref 20–33)
CREAT SERPL-MCNC: 0.72 MG/DL (ref 0.5–1.4)
EOSINOPHIL # BLD AUTO: 0.07 K/UL (ref 0–0.51)
EOSINOPHIL NFR BLD: 1.8 % (ref 0–6.9)
ERYTHROCYTE [DISTWIDTH] IN BLOOD BY AUTOMATED COUNT: 40.8 FL (ref 35.9–50)
FASTING STATUS PATIENT QL REPORTED: NORMAL
GFR SERPLBLD CREATININE-BSD FMLA CKD-EPI: 112 ML/MIN/1.73 M 2
GLOBULIN SER CALC-MCNC: 2.8 G/DL (ref 1.9–3.5)
GLUCOSE SERPL-MCNC: 84 MG/DL (ref 65–99)
HCT VFR BLD AUTO: 41.9 % (ref 37–47)
HDLC SERPL-MCNC: 58 MG/DL
HGB BLD-MCNC: 14.2 G/DL (ref 12–16)
IMM GRANULOCYTES # BLD AUTO: 0.01 K/UL (ref 0–0.11)
IMM GRANULOCYTES NFR BLD AUTO: 0.3 % (ref 0–0.9)
LDLC SERPL CALC-MCNC: 118 MG/DL
LYMPHOCYTES # BLD AUTO: 1.48 K/UL (ref 1–4.8)
LYMPHOCYTES NFR BLD: 38.6 % (ref 22–41)
MCH RBC QN AUTO: 30.9 PG (ref 27–33)
MCHC RBC AUTO-ENTMCNC: 33.9 G/DL (ref 33.6–35)
MCV RBC AUTO: 91.1 FL (ref 81.4–97.8)
MONOCYTES # BLD AUTO: 0.26 K/UL (ref 0–0.85)
MONOCYTES NFR BLD AUTO: 6.8 % (ref 0–13.4)
NEUTROPHILS # BLD AUTO: 1.98 K/UL (ref 2–7.15)
NEUTROPHILS NFR BLD: 51.7 % (ref 44–72)
NRBC # BLD AUTO: 0 K/UL
NRBC BLD-RTO: 0 /100 WBC
PLATELET # BLD AUTO: 254 K/UL (ref 164–446)
PMV BLD AUTO: 10.3 FL (ref 9–12.9)
POTASSIUM SERPL-SCNC: 4.8 MMOL/L (ref 3.6–5.5)
PROT SERPL-MCNC: 7.6 G/DL (ref 6–8.2)
RBC # BLD AUTO: 4.6 M/UL (ref 4.2–5.4)
SODIUM SERPL-SCNC: 138 MMOL/L (ref 135–145)
TRIGL SERPL-MCNC: 75 MG/DL (ref 0–149)
TSH SERPL DL<=0.005 MIU/L-ACNC: 2.05 UIU/ML (ref 0.38–5.33)
WBC # BLD AUTO: 3.8 K/UL (ref 4.8–10.8)

## 2022-05-20 PROCEDURE — 36415 COLL VENOUS BLD VENIPUNCTURE: CPT

## 2022-05-20 PROCEDURE — 85025 COMPLETE CBC W/AUTO DIFF WBC: CPT

## 2022-05-20 PROCEDURE — 84443 ASSAY THYROID STIM HORMONE: CPT

## 2022-05-20 PROCEDURE — 82306 VITAMIN D 25 HYDROXY: CPT

## 2022-05-20 PROCEDURE — 80061 LIPID PANEL: CPT

## 2022-05-20 PROCEDURE — 80053 COMPREHEN METABOLIC PANEL: CPT

## 2022-05-22 LAB — 25(OH)D3 SERPL-MCNC: 42 NG/ML (ref 30–80)

## 2022-06-27 ENCOUNTER — OFFICE VISIT (OUTPATIENT)
Dept: MEDICAL GROUP | Facility: MEDICAL CENTER | Age: 37
End: 2022-06-27
Payer: COMMERCIAL

## 2022-06-27 VITALS
HEIGHT: 65 IN | OXYGEN SATURATION: 100 % | DIASTOLIC BLOOD PRESSURE: 64 MMHG | BODY MASS INDEX: 22.66 KG/M2 | SYSTOLIC BLOOD PRESSURE: 104 MMHG | HEART RATE: 73 BPM | WEIGHT: 136 LBS | TEMPERATURE: 98.1 F

## 2022-06-27 DIAGNOSIS — R79.89 ABNORMAL CBC: ICD-10-CM

## 2022-06-27 DIAGNOSIS — R76.8 ANTI-TPO ANTIBODIES PRESENT: ICD-10-CM

## 2022-06-27 DIAGNOSIS — E78.00 ELEVATED LDL CHOLESTEROL LEVEL: ICD-10-CM

## 2022-06-27 PROCEDURE — 99214 OFFICE O/P EST MOD 30 MIN: CPT | Performed by: FAMILY MEDICINE

## 2022-06-27 ASSESSMENT — FIBROSIS 4 INDEX: FIB4 SCORE: 0.58

## 2022-06-28 PROBLEM — R79.89 ABNORMAL CBC: Status: ACTIVE | Noted: 2022-06-28

## 2022-06-28 PROBLEM — E78.00 ELEVATED LDL CHOLESTEROL LEVEL: Status: ACTIVE | Noted: 2022-06-28

## 2022-06-28 NOTE — PROGRESS NOTES
"Subjective:     CC: Diagnoses of Abnormal CBC, Anti-TPO antibodies present, and Elevated LDL cholesterol level were pertinent to this visit.    HPI: Patient is a 37 y.o. female established patient who presents today to follow up on labs.       Abnormal CBC  WBC count slightly low.   Patient asymptomatic.   ANC 1.98    Anti-TPO antibodies present  Chronic problem.   TSH remains normal  Not on medication.  Now 7 weeks pregnant    Elevated LDL cholesterol level  Chronic problem.   Mildly elevated  Patient has healthy lifestyle.       History reviewed. No pertinent past medical history.    Social History     Tobacco Use   • Smoking status: Never Smoker   • Smokeless tobacco: Never Used   Vaping Use   • Vaping Use: Never used   Substance Use Topics   • Alcohol use: Yes     Comment: rare   • Drug use: No       Current Outpatient Medications Ordered in Epic   Medication Sig Dispense Refill   • Prenatal Vit-Fe Fumarate-FA (PRENATAL MULTIVITAMIN/IRON PO) PRENATAL VITAMIN TABS     • imiquimod (ALDARA) 5 % cream PLEASE SEE ATTACHED FOR DETAILED DIRECTIONS (Patient not taking: Reported on 6/27/2022)       No current [facility]-ordered facility-administered medications on file.       Allergies:  Sulfa drugs    Health Maintenance: Completed      Objective:       Exam:  /64 (BP Location: Right arm, Patient Position: Sitting, BP Cuff Size: Adult long)   Pulse 73   Temp 36.7 °C (98.1 °F) (Temporal)   Ht 1.651 m (5' 5\")   Wt 61.7 kg (136 lb)   LMP 05/09/2022   SpO2 100%   BMI 22.63 kg/m²  Body mass index is 22.63 kg/m².      General: Normal appearing. No distress.  HEAD: NCAT  EYES: conjunctiva clear, lids without ptosis, pupils equal and reactive to light  EARS: ears normal shape and contour.   Neck:  Normal ROM  Pulmonary: Normal effort. Normal respiratory rate.  Cardiovascular: Well perfused. No LE edema  Neurologic: Grossly normal, no focal deficits  Skin: Warm and dry.  No obvious lesions.  Musculoskeletal: Normal gait " and station.   Psych: Normal mood and affect. Alert and oriented x3. Judgment and insight is normal.      Labs: 5/20/22 Results reviewed and discussed with the patient, questions answered.    Assessment & Plan:     37 y.o. female with the following -     1. Abnormal CBC  Mildly low WBC at 3.6.  ANC was 1.98.  Patient is now 7 weeks pregnant.  She is back to get her prenatal labs.  We will continue to follow her CBC.  As she is asymptomatic.    2. Anti-TPO antibodies present  Chronic problem, TSH remains normal.  Advised she would likely need to continue to monitor her TSH during her pregnancy, patient voiced understanding.    3. Elevated LDL cholesterol level  Mildly elevated LDL.  Otherwise her lipid panel looks great.  She is active, has a healthy diet.  No significant family history of heart disease or stroke.  Plan to continue monitor.    Return in about 6 months (around 12/27/2022).    Please note that this dictation was created using voice recognition software. I have made every reasonable attempt to correct obvious errors, but I expect that there are errors of grammar and possibly content that I did not discover before finalizing the note.

## 2022-11-15 NOTE — PROGRESS NOTES
"Subjective:     CC: vaginal itchiness x 2 days    HPI:   Lisa presents today with     Lisa is 27 week pregnant with new vaginal itchiness that started 2 days ago.  Last Friday she did her glucose screening but no other known possible triggers.  She has had yeast infections in the past, one in pregnancy, but not frequency.  No pain with urination, urgency or frequency. No abnormal vaginal discharge.  No fevers or chills.  No cramping, abdominal pain, nausea or vomiting.  No vaginal bleeding. She has not tried anything for symptoms.  Her baby is moving regularly.    Social History     Tobacco Use    Smoking status: Never    Smokeless tobacco: Never   Vaping Use    Vaping Use: Never used   Substance Use Topics    Alcohol use: Yes     Comment: rare    Drug use: No       Current Outpatient Medications Ordered in Epic   Medication Sig Dispense Refill    Prenatal Vit-Fe Fumarate-FA (PRENATAL MULTIVITAMIN/IRON PO) PRENATAL VITAMIN TABS      imiquimod (ALDARA) 5 % cream PLEASE SEE ATTACHED FOR DETAILED DIRECTIONS (Patient not taking: Reported on 6/27/2022)       No current [facility]-ordered facility-administered medications on file.       Allergies:  Sulfa drugs    Health Maintenance: Flu shot declined    ROS:  Gen: no fevers/chills  GI: no nausea/vomiting, no diarrhea, no abdominal pain  : no dysuria, no urgency, no frequency      Objective:       Exam:  /60 (BP Location: Left arm, Patient Position: Sitting, BP Cuff Size: Adult long)   Pulse 91   Temp 36.8 °C (98.3 °F) (Temporal)   Ht 1.651 m (5' 5\")   Wt 67.5 kg (148 lb 14.7 oz)   LMP 05/09/2022   SpO2 95%   BMI 24.78 kg/m²  Body mass index is 24.78 kg/m².    Gen: Alert and oriented, No apparent distress.  Pelvic exam: Pt declines internal examination  Perineum: No external lesions are noted, color is erythematous with curd-like white fluid noted externally on vulva  Vagina: Vaginal vault is well rugated partially visualized      A chaperone was offered " to the patient during today's exam. Chaperone name: Jhoana Parada was present.    Assessment & Plan:     37 y.o. female with the following -     1. Skin yeast infection  New issue with vulva erythema and curd like discharge.  She had recent normal GC/CT testing and declines repeat testing and declined internal exam.  Given her localized symptoms and no vaginal discharge, recommended OTC Monistat externally for 7 days with internal Monistat suppository use if not improved with topicals.  SE profile discussed and follow-up precautions given. Patient expressed understanding and agreement with plan.    Return if symptoms worsen or fail to improve.    Please note that this dictation was created using voice recognition software. I have made every reasonable attempt to correct obvious errors, but I expect that there are errors of grammar and possibly content that I did not discover before finalizing the note.

## 2022-11-16 ENCOUNTER — OFFICE VISIT (OUTPATIENT)
Dept: MEDICAL GROUP | Facility: MEDICAL CENTER | Age: 37
End: 2022-11-16
Payer: COMMERCIAL

## 2022-11-16 VITALS
HEIGHT: 65 IN | DIASTOLIC BLOOD PRESSURE: 60 MMHG | WEIGHT: 148.92 LBS | BODY MASS INDEX: 24.81 KG/M2 | OXYGEN SATURATION: 95 % | HEART RATE: 91 BPM | SYSTOLIC BLOOD PRESSURE: 102 MMHG | TEMPERATURE: 98.3 F

## 2022-11-16 DIAGNOSIS — B37.2 SKIN YEAST INFECTION: ICD-10-CM

## 2022-11-16 PROCEDURE — 99213 OFFICE O/P EST LOW 20 MIN: CPT | Performed by: FAMILY MEDICINE

## 2022-11-16 ASSESSMENT — FIBROSIS 4 INDEX: FIB4 SCORE: 0.58

## 2022-12-15 ENCOUNTER — HOSPITAL ENCOUNTER (OUTPATIENT)
Facility: MEDICAL CENTER | Age: 37
End: 2022-12-15
Attending: OBSTETRICS & GYNECOLOGY | Admitting: OBSTETRICS & GYNECOLOGY
Payer: COMMERCIAL

## 2023-03-04 ENCOUNTER — TELEMEDICINE (OUTPATIENT)
Dept: TELEHEALTH | Facility: TELEMEDICINE | Age: 38
End: 2023-03-04
Payer: COMMERCIAL

## 2023-03-04 DIAGNOSIS — N64.4 NIPPLE PAIN: ICD-10-CM

## 2023-03-04 DIAGNOSIS — Z91.89 BREASTFEEDING PROBLEM: ICD-10-CM

## 2023-03-04 PROCEDURE — 99214 OFFICE O/P EST MOD 30 MIN: CPT | Mod: 95 | Performed by: NURSE PRACTITIONER

## 2023-03-04 ASSESSMENT — ENCOUNTER SYMPTOMS
FEVER: 0
CONSTITUTIONAL NEGATIVE: 1

## 2023-03-04 ASSESSMENT — VISUAL ACUITY: OU: 1

## 2023-03-04 NOTE — PROGRESS NOTES
Subjective:     Lisa Watters is a 37 y.o. female who presents for No chief complaint on file.       NOTE: This encounter was conducted via Zoom and with secure and encrypted videoconferencing equipment. The patient was in a private location in the state [facility]. The patient's identity was confirmed and verbal consent was obtained to proceed with this virtual visit.    Other  This is a new problem. The problem has been gradually worsening. Pertinent negatives include no fever.     Postpartum 2 weeks. Breastfeeding . Pediatrician started baby on nystatin for oral thrush. Mother reports painful latching, otherwise no other symptoms.    Review of Systems   Constitutional: Negative.  Negative for fever.   Skin: Negative.    All other systems reviewed and are negative.    Additional details per HPI.     PMH:  has no past medical history on file.    MEDS:   Current Outpatient Medications:     betamethasone valerate (VALISONE) 0.1 % Ointment, Apply thin layer of compounded ointment twice daily as needed for nipple discomfort, Disp: 10 g, Rfl: 0    miconazole (MICOTIN) 2 % powder, Apply thin layer of compounded ointment twice daily as needed for nipple discomfort, Disp: 10 g, Rfl: 0    mupirocin (BACTROBAN) 2 % Ointment, Apply thin layer of compounded ointment twice daily as needed for nipple discomfort, Disp: 10 g, Rfl: 0    Prenatal Vit-Fe Fumarate-FA (PRENATAL MULTIVITAMIN/IRON PO), PRENATAL VITAMIN TABS, Disp: , Rfl:     ALLERGIES:   Allergies   Allergen Reactions    Sulfa Drugs Rash     Total body rash X 1     SURGHX: No past surgical history on file.  SOCHX:  reports that she has never smoked. She has never used smokeless tobacco. She reports current alcohol use. She reports that she does not use drugs.     FH: Reviewed with patient, not pertinent to this visit.      Objective:     LMP 2022     NOTE: Virtual encounter. Physical exam limited. Vital signs not performed.    Physical  Exam  Nursing note reviewed.   Constitutional:       General: She is not in acute distress.     Appearance: She is well-developed. She is not ill-appearing or toxic-appearing.   Eyes:      General: Vision grossly intact.   Cardiovascular:      Rate and Rhythm: Normal rate.   Pulmonary:      Effort: Pulmonary effort is normal. No respiratory distress.   Musculoskeletal:         General: No deformity. Normal range of motion.   Skin:     Coloration: Skin is not pale.   Neurological:      Mental Status: She is alert and oriented to person, place, and time.      Motor: No weakness.   Psychiatric:         Behavior: Behavior normal. Behavior is cooperative.       Assessment/Plan:     1. Nipple pain  - betamethasone valerate (VALISONE) 0.1 % Ointment; Apply thin layer of compounded ointment twice daily as needed for nipple discomfort  Dispense: 10 g; Refill: 0  - miconazole (MICOTIN) 2 % powder; Apply thin layer of compounded ointment twice daily as needed for nipple discomfort  Dispense: 10 g; Refill: 0  - mupirocin (BACTROBAN) 2 % Ointment; Apply thin layer of compounded ointment twice daily as needed for nipple discomfort  Dispense: 10 g; Refill: 0    2. Breastfeeding problem    3. Thrush,     Differential diagnosis, natural history, supportive care, over-the-counter symptom management per 's instructions, close monitoring, and indications for immediate follow-up discussed.    Hayden recently diagnosed with oral thrush and started on nystatin. Has been breastfeeding. Patient reporting painful latching.    I do recommend patient starts a topical antifungal. She is inquiring about combination ointment she was reading about online, specifically an APNO or all-purpose nipple ointment pioneered by Dr Cheikh Maldonado of the [employer] in [place] which would cover for fungal infection, bacterial infection, and pain/inflammation.    I suggested each ingredient can be prescribed individually  and patient may use them together. However, she would like to obtain a compounded ointment if possible.    I called and spoke with the pharmacist at [facility] pharmacy on [address] who states 2 of the ingredients and tubes are out of stock and would need to be ordered. He also states that patient's insurance would not cover the medication as it would be compounded. The pharmacist advises a retail pharmacy like [facility] would likely have the individual medications in stock. Although they would not be able to compound them, patient could apply each medication individually. I phoned patient about this, but she would like to try the [facility] first. They are closed on weekends. I did send an electronic Rx. She will follow up with them when they open Monday.    She will continue to treat  with nystatin as previously directed. She will continue to breastfeed as tolerated.    Advised her to monitor her symptoms and seek immediate medical attention if symptoms change/worsen.    All questions answered. Patient agrees with the plan of care.    Billing note: moderate complexity and moderate risk. Established patient. [id number]. Please refer to LOS tool for details.

## 2023-03-06 ENCOUNTER — OFFICE VISIT (OUTPATIENT)
Dept: MEDICAL GROUP | Facility: MEDICAL CENTER | Age: 38
End: 2023-03-06
Payer: COMMERCIAL

## 2023-03-06 VITALS
HEIGHT: 65 IN | WEIGHT: 148 LBS | OXYGEN SATURATION: 97 % | DIASTOLIC BLOOD PRESSURE: 66 MMHG | SYSTOLIC BLOOD PRESSURE: 120 MMHG | HEART RATE: 79 BPM | BODY MASS INDEX: 24.66 KG/M2 | TEMPERATURE: 97.3 F

## 2023-03-06 DIAGNOSIS — N64.4 NIPPLE PAIN: ICD-10-CM

## 2023-03-06 PROCEDURE — 99213 OFFICE O/P EST LOW 20 MIN: CPT | Performed by: FAMILY MEDICINE

## 2023-03-06 ASSESSMENT — FIBROSIS 4 INDEX: FIB4 SCORE: 0.58

## 2023-03-06 ASSESSMENT — PATIENT HEALTH QUESTIONNAIRE - PHQ9: CLINICAL INTERPRETATION OF PHQ2 SCORE: 0

## 2023-03-06 NOTE — PROGRESS NOTES
"Subjective:     CC: The encounter diagnosis was Nipple pain.    HPI: Patient is a 37 y.o. female established patient who presents today with the following concern.     Nipple pain  New problem.   Patient is 3 weeks postpartum  Her  was recently diagnosed and is being treated for thrush. The baby is asymptomatic.   The patient notes some slight pain with latching but otherwise doing well.   No nipple skin breakdown.       History reviewed. No pertinent past medical history.    Social History     Tobacco Use    Smoking status: Never    Smokeless tobacco: Never   Vaping Use    Vaping Use: Never used   Substance Use Topics    Alcohol use: Yes     Comment: rare    Drug use: No       Current Outpatient Medications Ordered in Epic   Medication Sig Dispense Refill    betamethasone valerate (VALISONE) 0.1 % Ointment Apply thin layer of compounded ointment twice daily as needed for nipple discomfort 10 g 0    miconazole (MICOTIN) 2 % powder Apply thin layer of compounded ointment twice daily as needed for nipple discomfort 10 g 0    mupirocin (BACTROBAN) 2 % Ointment Apply thin layer of compounded ointment twice daily as needed for nipple discomfort 10 g 0    Prenatal Vit-Fe Fumarate-FA (PRENATAL MULTIVITAMIN/IRON PO) PRENATAL VITAMIN TABS       No current Epic-ordered facility-administered medications on file.       Allergies:  Sulfa drugs    Health Maintenance: Completed      Objective:       Exam:  /66 (BP Location: Right arm, Patient Position: Sitting, BP Cuff Size: Adult long)   Pulse 79   Temp 36.3 °C (97.3 °F) (Temporal)   Ht 1.651 m (5' 5\")   Wt 67.1 kg (148 lb)   LMP 2022   SpO2 97%   BMI 24.63 kg/m²  Body mass index is 24.63 kg/m².    Breast:No skin breakdown of the nipples, no significant redness, swelling or discoloration.     Assessment & Plan:     37 y.o. female with the following -     1. Nipple pain  New problem. Patient concerned about thrush as baby had thrush treatment. However, on " exam, no concerning findings, and patient only having pain with latch which is mild. Baby does not appear to have thrush either.      No follow-ups on file.    Please note that this dictation was created using voice recognition software. I have made every reasonable attempt to correct obvious errors, but I expect that there are errors of grammar and possibly content that I did not discover before finalizing the note.

## 2023-03-06 NOTE — ASSESSMENT & PLAN NOTE
New problem.   Patient is 3 weeks postpartum  Her  was recently diagnosed and is being treated for thrush. The baby is asymptomatic.   The patient notes some slight pain with latching but otherwise doing well.   No nipple skin breakdown.

## 2023-03-16 ENCOUNTER — OFFICE VISIT (OUTPATIENT)
Dept: OBGYN | Facility: CLINIC | Age: 38
End: 2023-03-16
Payer: COMMERCIAL

## 2023-03-16 DIAGNOSIS — O92.70 LACTATION PROBLEM: ICD-10-CM

## 2023-03-16 PROCEDURE — 99215 OFFICE O/P EST HI 40 MIN: CPT | Performed by: NURSE PRACTITIONER

## 2023-03-16 NOTE — PROGRESS NOTES
Summary: Third baby, others had different difficulties with breastfeeding. Baby has been seen for oral thrush which was not present. Baby chokes and coughs on the right side at breast. Mom offers one breast each feeding. Infant followed by chiropractor for general tightness and gassiness. Baby doesn't settle well at night. Gassy baby  Today: Mom independently latches, baby removed 2.1oz from the left and was off and content for a short time, offered the right side for an additional 0.9oz, total intake 3oz more consistent with age of baby. Pumping not indicated.  Plan: Was considering intentionally decreasing supply due to coughing but it may be that the suck/swallow breath on the right is more difficult for the baby given her posturing. Decreasing supply would make things worse so will hold on decreasing supply, support chiropractic care in facilitating tightness and neck  preference. Feed on both sides at some feedings, around bed time and mid afternoon. May pump off an ounce prior to the right side or use a milk catcher the prior feed to collect an ounce to have less available for her to remove. Gassy baby left sided feeding and EVIVO  Follow up:   Lactation appointment: As needed and Group Encouraged  Baby 's Provider appointment: 1 Month Well Child Check   Referrals: None    Maternal Diagnosis/Problem:  Lactation Problem  Infant Diagnosis/Problem:  At risk for breastfeeding difficulties    Subjective:     Lisa Watters is a 37 y.o. female here for lactation care. She is here today with baby.    Concerns:   Maternal: Oversupply , Infant feeding evaluation, and Breastfeeding questions   Infant: Gassy baby  Coughing and choking on the right side    HPI:   Pertinent  history:     Mother does not have a history of GDM, hypertension prior to pregnancy, GHTN, insulin resistance, multiple gestation, PCOS, thyroid disease, auto immune disease , and breast surgery    Mother does have advanced  maternal age. Common condition(s) that may interfere in milk supply.    FEEDING HISTORY:    Previous Breastfeeding History:  Third baby, others had different difficulties with breastfeeding.   Currently 3/16/2023: Baby has been seen for oral thrush which was not present. Baby chokes and coughs on the right side at breast. Mom offers one breast each feeding. Infant followed by chiropractor for general tightness and gassiness. Baby doesn't settle well at night. Gassy baby     Both breasts: No     Supplement: None     Nipple Shield Use: None    Breast Pumping:  Not Pumping     Maternal ROS:  Constitutional: No fever, chills. Feeling well  Breasts: No soreness of breasts and No soreness of nipples  Psychiatric: Managing ok  Mental Health: No mention of feeling irritable, agitated, angry, overwhelmed, apathetic, appetite changes, exhausted nor having sleep changes outside infant feeds/demands.    History reviewed. No pertinent past medical history.      Objective:     Maternal Physical Lactation Exam  General: No acute distress  Breasts: Symmetrical , Soft, Plugged Duct - no evidence, and Mastitis  - no S/S  Nipples: intact  Psychiatric: Normal mood and affect. Her behavior is normal. Judgment and thought content normal.  Mental Health: Did NOT exhibit sadness, crying, feeling overwhelmed, agitation or hypervigilance.    Assessment/Plan & Lactation Counseling:     Infant Exam on Infant Chart    Infant Weight History:   2/15/23 9#9oz  3/2/23 10#4oz  3/14/23 10#10.3oz  3/16/2023 10#12.2oz    Infant Intake at Breast: 2.1oz left      0.9oz right    Total: 3.0oz  Milk Transfer at this feeding:   Effective breastfeeding   Pumped: Not indicated     Initiation of Feeding: Infant initiates  Position of Feeding:    Right: cross cradle laid back  Left: cross cradle laid back  Attachment Achieved: rapidly  Nipple shield: N/A       Suck Pattern at the Breast: Suck burst and normal rest  Suck Pattern on the Bottle: Not  Indicated    Behavior Following Observed Feeding: content  Nipple Pain: None     Latch: Mom latches independently  Suckling/Feeding: attaches, baby fed effectively, baby roots, elicits TIP, and rhythmic  Sucking strength: Moderate Strong  Sucking Rhythm Coordinated   Compression: WNL    Once latched, baby fell into a mature and fully integrated SSB pattern.    Swallowing No difficulty noted  Milk Supply Available: normal +    MATERNAL PERSONALIZED BREASTFEEDING PLAN  Discussed concerns and symptoms as listed above in assessment and guidance summarized below. Shared decision making was used between myself and the family for this encounter, home care, and follow up. Topics reviewed included:   4th Trimester: The 12-week period immediately after mom has had the baby. Not everyone has heard of it, but every mother and their  baby will go through it. It is a time of great physical and emotional change as the baby adjusts to being outside the womb, and the family adjusts to new life as parents  During the fourth trimester, one can expect fussiness and crying from the baby and very likely exhaustion for the family. [place] babies are learning to adjust to life outside the womb where it was warm and squishy!  There is a lot of misinformation about babies and their needs, and parents are often encouraged to ignore baby's signals. Bad idea. Babies are “half-baked” at birth and have much to learn with the help of physical and emotional support from caregivers. Taking care of a baby's needs is an investment that pays off with a happier, healthier child and adult.  It can take weeks or even months for your body to feel totally normal again. There is a major hormonal upheaval experienced by moms in the first few weeks after birth, because their bodies are shifting from many pregnancy hormones to a more normal hormonal make-up.  These first three months with baby earthside is a delicate time. Honor it with a mindful dose of  "support. Mindful Mamma's is an radha that may help.   Self-Compassion through Relational Support and Interaction.   Be kind to ourself. This is the first step in reducing anxiety and depression. Pay attention to how you are doing.   What do you need? Food, Rest, Sleep, Support, to Laugh/giggle: who will you ask today? They want to help you. We want to help you.   How do you stop your self-blame, or your self-criticism?   Get out of the house each day, walk or drive somewhere or ask a friend to drive you,  a \"treat\" at a drive through.   Mood and Anxiety Disorders: Having a new baby can be joyful time for some new moms, but a difficult time for others. For all, it is a time of profound physical and emotional change. Balancing baby, family, partners and friends, work, pets, and preexisting or new life stressors as well as sleep deprivation can be extremely challenging. Untreated depression, sleep exhaustion, and anxiety are each a challenge that must be addressed and in addition can contribute to early cessation of breastfeeding. It is important both for the mental health and physical health of new moms and babies to get on the path to feeling better and if therapeutic support is needed, specific resources are available, please ask.   Sleep or Lack of: Human Giver Syndrome (moms) tells us it’s “self-indulgent” to rest and sleep, which makes as much sense as believing it’s weak or selfindulgent to breathe. We discussed strategies to manage restorative sleep, although short amounts, significant to the mental health of the mother. The principle follows:  Mom goes to sleep right after 8pm +/- feeding  Partner covers first late evening feeding (10pm/11pm), settles baby,  then goes to bed  Mom covers next feeding 1am /2am, partner sleeps  Next feeding share  Hyperlactation (Oversupply) This is a high rate of milk production often causing breast discomfort and or compelling moms to express beyond what the baby " is taking.There is no defined clinical criteria. Infant can present with stopping to suckle/letting go, milk spraying in the baby's face, baby coughing or choking or breast refusal, struggle with initial letdown with gasping, fussiness, rapid weight gain (1# a week), excessive gas and refusing the second breast or breast with larger supply.   Causes:   Hernando phenomenon breasts don't respond to feedback of fullness  Large storage capacity  Will treat baby for more effective feedings before reducing supply  Milk Supply is dependent on glandular tissue development, hormonal influences, how many times the baby removes milk and how well the breasts are emptied in a 24 hour period. This is a biological reality that we can NOT work around. If, for any reason, your baby is not latching, or you are not able to nurse, then it is important for you to remove the milk instead by pumping or hand expression.  There's no magic trick, tea, food, drink, cookie or supplement that will increase your milk supply. One  must  effectively remove milk to continue to make and maximize milk. In the early days and weeks that can be 8+ times in 24 hours. For older babies, on average 6-7 + times in 24 hours.    Hydration: Staying hydrated is important however lack of hydration is usually not a cause of significantly low milk production.  Everyone needs a different amount of water, depending on their activity and diet. A high salt and/or high-protein diet, high physical activity, or very warm weather/sweating will require more fluids. A person eating a diet high in veggies and fruit, with a lack of physical activity will require fewer fluids. There is no magic number for the # of ounces of water each day.The best way to know that you are well hydrated is by looking at your urine.  Urine should look clear to light pale yellow, and you should need to pee at least every 3 to 4 hours unless you have a large bladder capacity.  Feeding:   Infant  feeding well given current interval growth, guidelines to follow:  Offer both breasts at some feedings  Feed your baby every 1.5-3 hours, more often if baby acts hungry.   Awaken baby for feeding if going over 3 hours in the day.   Daily goal: 8-12 feedings per 24 hours.    Given infants weight you may allow baby to go longer at night but that generally means shorter durations in the day.  Strategies to facilitate tongue use and protect airway.      Sometimes our work is to disrupt old patterns so that new and more functional patterns can be established.  At the breast               Neck in neutral, lift occiput no pressure, just lift              Relax shoulder with gentle pressure down    Positioning Techniques for Bare Breast  Pillow used: Luzma 2 Nu Luiz  Suggested positions Cross cradle, Football, Side lying, and Laid Back    Caution with Breast Massage The word “Massage” means different things in the breastfeeding world. It is described and interpreted in so many different ways and unfortunately potentially harmful. The hands can be safe on a breast and  gentle to help in many ways but they also can be damaging when used in the wrong way.  Lymphatic drainage massage is appropriate,  open hand , lightly stroking only, and can be highly effective. The massage advice to knead , massage deeply , vibrate with marketed tools is  most concerning. Be gentle with this gland to not increase inflammation.   Storage (Acceptable guidelines for healthy term babies)  10 hours at room temp including your pieces, may rinse but not mandatory  8 days refrigerator, don't need  to refrigerate right away if using fresh pumped milk at the next feeding  Freezer 1 year  Deep freezer 2 years  American Academy or Pediatrics has said you may mix different temperature milks.   If baby drinks breastmilk from a fresh or refrigerated bottle of breastmilk,  you may return the unused portion to the refrigerator and use once at next feeding.      Breast Care  Plugs (a colloquial term for microscopic ductal inflammation and narrowing)  Inflammatory or infectious mastitis, breastpain including engorgement  or vasospasm  Breast rest - No Massage, don't overfeed of over pump, down regulate production if needed  Advil 800mg every 8 hours for 48 hours with food  Ice - 10 minutes  after feeding then every 30 minutes or as desired for repeating the ice. Don't put the ice directly on the skin.  May add Tylenol 1000mg every 8 hours for 48 hours in between the Advil dosing if needed for pain.  Answers to FAQs: https://www.infantrisk.com/category/breastfeeding  Alcohol & Breastfeeding: What's your time-to-zero?  Cough & Cold Medications while Breastfeeding  Vitamin D Supplementation and Breastfeeding  Antidepressant Use While Breastfeeding: What should I know?  Breastfeeding, Caffeine, and Energy Drinks  Recommended Websites or books:  [email or web address]eding.org/  Mati for grandmas Hoa Parker  Safe Infant Sleep, Dr. Avtar Evans ()    Thank you for connecting with other mothers:  Breastfeeding [place] including opportunity to weight your baby and do before and after weights WEIGHT CHECKS  Tuesdays 10am - 11am. [facility] at [address] [place] [address], [facility] [address] [place] [address], 3rd floor conference room  Check your baby's weight, do a feeding and see how your baby is growing, visit with other mothers, plan on a walk or coffee date after group.  Please download Growth: Baby and Child for Apple or Child Growth Tracker for [employer] if you would like to  document and follow your baby's growth curve.  Please wear a mask coming and going: you may remove it in the classroom  Due to space limitations - limit strollers please (New c/section moms please use your stroller).  We would love to have dads stay, but moms won't breastfeed if there are men in the room, sorry.  The room is generally scheduled for another event following group.  Please take all diapers  with you   ONLINE SUPPORT GROUPS  Postpartum [employer] (PSI) support groups are conducted using a [id number]-vd-[id number] support model and are not intended for those experiencing a mental health crisis. Groups are 90 minutes (1.5 hours) in length. The first ~30 minutes is providing information, education, and establishing group guidelines. The next ~60 minutes is “talk time,” in which group members share and talk with each other. Group members must be present for the group guidelines before joining in the discussion or “talk time.”     In Conclusion:   Managing breastfeeding and milk supply is very dynamic,can be a complex and intimate journey, and is not one size fits all. When obstacles present themselves, it takes confidence, persistence and support. The rights of the child include optimal nutrition and mothers need help to make informed decisions. You  and your baby have been screened for biological, psychological, and social risk factors that might interfere with achieving your goals.  Support is critical. You are now the focus of our Breastfeeding Medicine team; we are here to support your decisions and vision.     Follow up requires close monitoring in this time sensitive window of opportunity of managing supply to not decrease too much but not too high to cause her choking problems  Please call [phone] our voicemail line or the front office at [phone] to scheduled your next appointment.  Family is encouraged to e-mail or mychart us to update how the plan is working.    A total of 60 minutes, not including infant assessment time, with more than 50% was spent preparing to see the patient, obtaining and reviewing separately obtained history, performing a medically appropriate examination and evaluation, counseling and educating the family,  documenting clinical information in the electronic health record, independently interpreting weighted feeds and infant growth results, communicating these results to the  family and care coordination as detailed in the above note.       DALTON Pope.

## 2023-10-03 ENCOUNTER — APPOINTMENT (RX ONLY)
Dept: URBAN - METROPOLITAN AREA CLINIC 6 | Facility: CLINIC | Age: 38
Setting detail: DERMATOLOGY
End: 2023-10-03

## 2023-10-03 DIAGNOSIS — D22 MELANOCYTIC NEVI: ICD-10-CM

## 2023-10-03 DIAGNOSIS — L81.4 OTHER MELANIN HYPERPIGMENTATION: ICD-10-CM

## 2023-10-03 DIAGNOSIS — Z85.828 PERSONAL HISTORY OF OTHER MALIGNANT NEOPLASM OF SKIN: ICD-10-CM

## 2023-10-03 DIAGNOSIS — D18.0 HEMANGIOMA: ICD-10-CM

## 2023-10-03 DIAGNOSIS — L57.0 ACTINIC KERATOSIS: ICD-10-CM

## 2023-10-03 DIAGNOSIS — Z71.89 OTHER SPECIFIED COUNSELING: ICD-10-CM

## 2023-10-03 PROBLEM — D23.62 OTHER BENIGN NEOPLASM OF SKIN OF LEFT UPPER LIMB, INCLUDING SHOULDER: Status: ACTIVE | Noted: 2023-10-03

## 2023-10-03 PROBLEM — D22.5 MELANOCYTIC NEVI OF TRUNK: Status: ACTIVE | Noted: 2023-10-03

## 2023-10-03 PROBLEM — D18.01 HEMANGIOMA OF SKIN AND SUBCUTANEOUS TISSUE: Status: ACTIVE | Noted: 2023-10-03

## 2023-10-03 PROCEDURE — ? COUNSELING

## 2023-10-03 PROCEDURE — 17000 DESTRUCT PREMALG LESION: CPT

## 2023-10-03 PROCEDURE — 99213 OFFICE O/P EST LOW 20 MIN: CPT | Mod: 25

## 2023-10-03 PROCEDURE — ? ADDITIONAL NOTES

## 2023-10-03 PROCEDURE — ? LIQUID NITROGEN

## 2023-10-03 PROCEDURE — ? SUNSCREEN TREATMENT REGIMEN

## 2023-10-03 ASSESSMENT — LOCATION DETAILED DESCRIPTION DERM
LOCATION DETAILED: RIGHT MEDIAL SUPERIOR CHEST
LOCATION DETAILED: NASAL SUPRATIP
LOCATION DETAILED: PERIUMBILICAL SKIN
LOCATION DETAILED: MEDIAL FRONTAL SCALP
LOCATION DETAILED: EPIGASTRIC SKIN
LOCATION DETAILED: LEFT RADIAL DORSAL HAND
LOCATION DETAILED: RIGHT MEDIAL FOREHEAD
LOCATION DETAILED: LEFT INFERIOR MEDIAL MALAR CHEEK
LOCATION DETAILED: LEFT ULNAR DORSAL HAND
LOCATION DETAILED: RIGHT RADIAL DORSAL HAND

## 2023-10-03 ASSESSMENT — LOCATION ZONE DERM
LOCATION ZONE: FACE
LOCATION ZONE: TRUNK
LOCATION ZONE: SCALP
LOCATION ZONE: NOSE
LOCATION ZONE: HAND

## 2023-10-03 ASSESSMENT — LOCATION SIMPLE DESCRIPTION DERM
LOCATION SIMPLE: RIGHT HAND
LOCATION SIMPLE: FRONTAL SCALP
LOCATION SIMPLE: NOSE
LOCATION SIMPLE: LEFT CHEEK
LOCATION SIMPLE: RIGHT FOREHEAD
LOCATION SIMPLE: LEFT HAND
LOCATION SIMPLE: CHEST
LOCATION SIMPLE: ABDOMEN

## 2023-10-03 NOTE — PROCEDURE: LIQUID NITROGEN
Render Note In Bullet Format When Appropriate: No
Show Applicator Variable?: Yes
Number Of Freeze-Thaw Cycles: 2 freeze-thaw cycles
Detail Level: Detailed
Consent: The patient's consent was obtained including but not limited to risks of crusting, scabbing, blistering, scarring, darker or lighter pigmentary change, recurrence, incomplete removal and infection.
Duration Of Freeze Thaw-Cycle (Seconds): 10
Post-Care Instructions: I reviewed with the patient in detail post-care instructions. Patient is to wear sunprotection, and avoid picking at any of the treated lesions. Pt may apply Vaseline to crusted or scabbing areas.

## 2023-10-03 NOTE — PROCEDURE: ADDITIONAL NOTES
Detail Level: Simple
Additional Notes: Pt is nursing. Plan for 5FU or PDT in the future.\\nREcommended heliocare
Render Risk Assessment In Note?: no

## 2024-01-31 ENCOUNTER — APPOINTMENT (RX ONLY)
Dept: URBAN - METROPOLITAN AREA CLINIC 6 | Facility: CLINIC | Age: 39
Setting detail: DERMATOLOGY
End: 2024-01-31

## 2024-01-31 DIAGNOSIS — L57.8 OTHER SKIN CHANGES DUE TO CHRONIC EXPOSURE TO NONIONIZING RADIATION: ICD-10-CM

## 2024-01-31 PROBLEM — D48.5 NEOPLASM OF UNCERTAIN BEHAVIOR OF SKIN: Status: ACTIVE | Noted: 2024-01-31

## 2024-01-31 PROCEDURE — ? PHOTODYNAMIC THERAPY COUNSELING

## 2024-01-31 PROCEDURE — ? ADDITIONAL NOTES

## 2024-01-31 PROCEDURE — 99212 OFFICE O/P EST SF 10 MIN: CPT

## 2024-01-31 PROCEDURE — ? PHOTO-DOCUMENTATION

## 2024-01-31 NOTE — PROCEDURE: ADDITIONAL NOTES
Render Risk Assessment In Note?: no
Detail Level: Simple
Additional Notes: Pt unsure how long the lesion has been there, noticed 1 week ago. Discussed biopsy vs monitoring, patient declines biopsy today and elects to recheck in 4 weeks. If lesion still present at that time, she agrees to have it biopsied
Additional Notes: Will send PDT auth for face. Not recommended while breastfeeding. She is planning on discontinuing breastfeeding.

## 2024-02-28 ENCOUNTER — APPOINTMENT (RX ONLY)
Dept: URBAN - METROPOLITAN AREA CLINIC 6 | Facility: CLINIC | Age: 39
Setting detail: DERMATOLOGY
End: 2024-02-28

## 2024-02-28 PROBLEM — D48.5 NEOPLASM OF UNCERTAIN BEHAVIOR OF SKIN: Status: ACTIVE | Noted: 2024-02-28

## 2024-02-28 PROCEDURE — 11102 TANGNTL BX SKIN SINGLE LES: CPT

## 2024-02-28 PROCEDURE — ? BIOPSY BY SHAVE METHOD

## 2024-03-05 ENCOUNTER — APPOINTMENT (RX ONLY)
Dept: URBAN - METROPOLITAN AREA CLINIC 6 | Facility: CLINIC | Age: 39
Setting detail: DERMATOLOGY
End: 2024-03-05

## 2024-03-05 PROBLEM — C44.319 BASAL CELL CARCINOMA OF SKIN OF OTHER PARTS OF FACE: Status: ACTIVE | Noted: 2024-03-05

## 2024-03-05 PROCEDURE — ? COUNSELING

## 2024-03-05 PROCEDURE — ? PRESCRIPTION

## 2024-03-05 PROCEDURE — 99213 OFFICE O/P EST LOW 20 MIN: CPT

## 2024-03-05 PROCEDURE — ? ADDITIONAL NOTES

## 2024-03-05 PROCEDURE — ? DIAGNOSIS COMMENT

## 2024-03-05 RX ORDER — IMIQUIMOD 12.5 MG/.25G
CREAM TOPICAL
Qty: 12 | Refills: 0 | Status: ERX | COMMUNITY
Start: 2024-03-05

## 2024-03-05 RX ADMIN — IMIQUIMOD: 12.5 CREAM TOPICAL at 00:00

## 2024-03-05 NOTE — PROCEDURE: DIAGNOSIS COMMENT
Detail Level: Detailed
Render Risk Assessment In Note?: no
Comment: A. Right Superior Medial Forehead, Biopsy by Shave Method\\n\\nBASAL CELL CARCINOMA, SUPERFICIAL, MULTIFOCAL AND EARLY NODULAR PATTERNS

## 2024-03-05 NOTE — PROCEDURE: ADDITIONAL NOTES
Render Risk Assessment In Note?: no
Detail Level: Simple
Additional Notes: Discussed treatment options of ED&C, Saucerization, Aldara vs MOHS. Risks, efficacy, benefits of each explained. She would like to avoid Mohs. \\n\\nKathleen opts for Aldara, she will stop breastfeeding in 3-4 weeks, then start the Aldara QD 5 days a week for 6 weeks after she stops breastfeeding. She has used before and is familiar with potential reactions and side effects. \\n\\nShe has f/u appt with Dr. Wilhelm on 4/3, she may wait until her evaluation with Dr. Wilhelm before she initiates any treatment.

## 2024-04-03 ENCOUNTER — APPOINTMENT (RX ONLY)
Dept: URBAN - METROPOLITAN AREA CLINIC 6 | Facility: CLINIC | Age: 39
Setting detail: DERMATOLOGY
End: 2024-04-03

## 2024-04-03 DIAGNOSIS — L91.0 HYPERTROPHIC SCAR: ICD-10-CM

## 2024-04-03 DIAGNOSIS — Z71.89 OTHER SPECIFIED COUNSELING: ICD-10-CM

## 2024-04-03 DIAGNOSIS — L82.1 OTHER SEBORRHEIC KERATOSIS: ICD-10-CM

## 2024-04-03 DIAGNOSIS — I78.8 OTHER DISEASES OF CAPILLARIES: ICD-10-CM

## 2024-04-03 DIAGNOSIS — L81.4 OTHER MELANIN HYPERPIGMENTATION: ICD-10-CM

## 2024-04-03 DIAGNOSIS — Z85.828 PERSONAL HISTORY OF OTHER MALIGNANT NEOPLASM OF SKIN: ICD-10-CM

## 2024-04-03 DIAGNOSIS — D18.0 HEMANGIOMA: ICD-10-CM

## 2024-04-03 DIAGNOSIS — D22 MELANOCYTIC NEVI: ICD-10-CM

## 2024-04-03 PROBLEM — D23.62 OTHER BENIGN NEOPLASM OF SKIN OF LEFT UPPER LIMB, INCLUDING SHOULDER: Status: ACTIVE | Noted: 2024-04-03

## 2024-04-03 PROBLEM — D18.01 HEMANGIOMA OF SKIN AND SUBCUTANEOUS TISSUE: Status: ACTIVE | Noted: 2024-04-03

## 2024-04-03 PROBLEM — D22.5 MELANOCYTIC NEVI OF TRUNK: Status: ACTIVE | Noted: 2024-04-03

## 2024-04-03 PROBLEM — D22.39 MELANOCYTIC NEVI OF OTHER PARTS OF FACE: Status: ACTIVE | Noted: 2024-04-03

## 2024-04-03 PROBLEM — C44.319 BASAL CELL CARCINOMA OF SKIN OF OTHER PARTS OF FACE: Status: ACTIVE | Noted: 2024-04-03

## 2024-04-03 PROCEDURE — ? SUNSCREEN TREATMENT REGIMEN

## 2024-04-03 PROCEDURE — ? PRESCRIPTION

## 2024-04-03 PROCEDURE — ? ADDITIONAL NOTES

## 2024-04-03 PROCEDURE — ? OBSERVATION

## 2024-04-03 PROCEDURE — 11900 INJECT SKIN LESIONS </W 7: CPT

## 2024-04-03 PROCEDURE — ? INTRALESIONAL KENALOG

## 2024-04-03 PROCEDURE — ? PHOTO-DOCUMENTATION

## 2024-04-03 PROCEDURE — 99213 OFFICE O/P EST LOW 20 MIN: CPT | Mod: 25

## 2024-04-03 PROCEDURE — ? COUNSELING

## 2024-04-03 RX ORDER — IMIQUIMOD 12.5 MG/.25G
CREAM TOPICAL
Qty: 24 | Refills: 2 | Status: ERX

## 2024-04-03 ASSESSMENT — LOCATION SIMPLE DESCRIPTION DERM
LOCATION SIMPLE: CHEST
LOCATION SIMPLE: FRONTAL SCALP
LOCATION SIMPLE: RIGHT HAND
LOCATION SIMPLE: RIGHT FOREHEAD
LOCATION SIMPLE: ABDOMEN
LOCATION SIMPLE: LEFT HAND
LOCATION SIMPLE: LEFT CHEEK
LOCATION SIMPLE: LEFT UPPER ARM
LOCATION SIMPLE: UPPER BACK

## 2024-04-03 ASSESSMENT — LOCATION DETAILED DESCRIPTION DERM
LOCATION DETAILED: LEFT MEDIAL MALAR CHEEK
LOCATION DETAILED: SUPERIOR THORACIC SPINE
LOCATION DETAILED: MEDIAL FRONTAL SCALP
LOCATION DETAILED: LEFT LATERAL PROXIMAL UPPER ARM
LOCATION DETAILED: LEFT ULNAR DORSAL HAND
LOCATION DETAILED: EPIGASTRIC SKIN
LOCATION DETAILED: PERIUMBILICAL SKIN
LOCATION DETAILED: LEFT INFERIOR MEDIAL MALAR CHEEK
LOCATION DETAILED: RIGHT RADIAL DORSAL HAND
LOCATION DETAILED: RIGHT MEDIAL SUPERIOR CHEST
LOCATION DETAILED: RIGHT MEDIAL FOREHEAD
LOCATION DETAILED: LEFT RADIAL DORSAL HAND

## 2024-04-03 ASSESSMENT — LOCATION ZONE DERM
LOCATION ZONE: ARM
LOCATION ZONE: SCALP
LOCATION ZONE: HAND
LOCATION ZONE: FACE
LOCATION ZONE: TRUNK

## 2024-04-03 NOTE — PROCEDURE: INTRALESIONAL KENALOG
Include Z78.9 (Other Specified Conditions Influencing Health Status) As An Associated Diagnosis?: No
Validate Note Data When Using Inventory: Yes
Detail Level: Detailed
Expiration Date For Kenalog (Optional): 12/2025
Kenalog Preparation: Kenalog
How Many Mls Were Removed From The 40 Mg/Ml (1ml) Vial When Preparing The Injectable Solution?: 0
Consent: The risks of atrophy were reviewed with the patient.
Ndc# For Kenalog Only: 4292-8349-60
Which Kenalog Vial Was Used?: Kenalog 10 mg/ml (5 ml vial)
Kenalog Type Of Vial: Multiple Dose
Administered By (Optional): KATERINA
Lot # For Kenalog (Optional): [id number]
Total Volume (Ccs): 0.1
Concentration Of Kenalog Solution Injected (Mg/Ml): 10.0
Medical Necessity Clause: This procedure was medically necessary because the lesions that were treated were:

## 2024-04-03 NOTE — PROCEDURE: ADDITIONAL NOTES
Render Risk Assessment In Note?: no
Detail Level: Simple
Additional Notes: Can apply imiquimod for 2-3 weeks to AA on frontal/vertex scalp

## 2024-05-06 ENCOUNTER — APPOINTMENT (RX ONLY)
Dept: URBAN - METROPOLITAN AREA CLINIC 20 | Facility: CLINIC | Age: 39
Setting detail: DERMATOLOGY
End: 2024-05-06

## 2024-05-06 DIAGNOSIS — L57.0 ACTINIC KERATOSIS: ICD-10-CM

## 2024-05-06 PROCEDURE — ? PHOTODYNAMIC THERAPY COUNSELING

## 2024-05-06 PROCEDURE — ? PHOTO-DOCUMENTATION

## 2024-05-06 PROCEDURE — ? PDT: RED

## 2024-05-06 PROCEDURE — 96567 PDT DSTR PRMLG LES SKN: CPT

## 2024-05-06 ASSESSMENT — LOCATION ZONE DERM: LOCATION ZONE: FACE

## 2024-05-06 ASSESSMENT — LOCATION SIMPLE DESCRIPTION DERM: LOCATION SIMPLE: SUPERIOR FOREHEAD

## 2024-05-06 ASSESSMENT — LOCATION DETAILED DESCRIPTION DERM: LOCATION DETAILED: SUPERIOR MID FOREHEAD

## 2024-05-06 NOTE — PROCEDURE: PDT: RED
History Of Hsv?: No
Debridement Text (Will Only Render In Visit Note If You Select Debridement Option Under Who Performed The Pdt Field): Prior to application of the photodynamic medication the hyperkeratotic lesions were curetted to make them more amenable to therapy.
Number Of Kerasticks/Tubes Of Metvixia/Tubes Of Ameluz Used: 1
Who Performed The Pdt?: Performed by Nurse, MA or  with Pre-Procedure Debridement of Hyperkeratotic Lesions ([id number])
Photosensitizer: Ameluz
Consent: Written consent obtained.  The risks were reviewed with the patient including but not limited to: pigmentary changes, pain, blistering, scabbing, redness, and the remote possibility of scarring.
Incubation Time (Set To 00:00:00 If Not Wanted): 01:30:00
Lot # (Optional): [id number]
Post-Care Instructions: I reviewed with the patient in detail post-care instructions. Patient is to avoid sunlight for the next 2 days, and wear sun protection. Patients may expect sunburn like redness, discomfort and scabbing.
Ndc# (Optional): 04870-239-04
Eye Protection Applied?: Yes
Anesthesia Type: 1% lidocaine with epinephrine
Illumination Time: 7 min 7 sec
Expiration Date (Optional): 2/2025
Total Number Of Aks Treated (Optional To Report): 0
Detail Level: Zone
Application Method: without occlusion

## 2024-05-09 ENCOUNTER — APPOINTMENT (RX ONLY)
Dept: URBAN - METROPOLITAN AREA CLINIC 6 | Facility: CLINIC | Age: 39
Setting detail: DERMATOLOGY
End: 2024-05-09

## 2024-05-09 PROBLEM — C44.319 BASAL CELL CARCINOMA OF SKIN OF OTHER PARTS OF FACE: Status: ACTIVE | Noted: 2024-05-09

## 2024-05-09 PROCEDURE — 99212 OFFICE O/P EST SF 10 MIN: CPT

## 2024-05-09 PROCEDURE — ? ADDITIONAL NOTES

## 2024-05-09 PROCEDURE — ? OBSERVATION

## 2024-05-09 PROCEDURE — ? COUNSELING

## 2024-05-09 NOTE — PROCEDURE: ADDITIONAL NOTES
Render Risk Assessment In Note?: no
Detail Level: Simple
Additional Notes: Recheck in 2-3 weeks, consider additional treatment pending examination

## 2024-05-30 ENCOUNTER — APPOINTMENT (RX ONLY)
Dept: URBAN - METROPOLITAN AREA CLINIC 6 | Facility: CLINIC | Age: 39
Setting detail: DERMATOLOGY
End: 2024-05-30

## 2024-05-30 DIAGNOSIS — Z85.828 PERSONAL HISTORY OF OTHER MALIGNANT NEOPLASM OF SKIN: ICD-10-CM | Status: STABLE

## 2024-05-30 DIAGNOSIS — L57.8 OTHER SKIN CHANGES DUE TO CHRONIC EXPOSURE TO NONIONIZING RADIATION: ICD-10-CM | Status: INADEQUATELY CONTROLLED

## 2024-05-30 PROCEDURE — ? OBSERVATION

## 2024-05-30 PROCEDURE — ? ADDITIONAL NOTES

## 2024-05-30 PROCEDURE — ? COUNSELING

## 2024-05-30 PROCEDURE — ? PRESCRIPTION

## 2024-05-30 PROCEDURE — ? PRESCRIPTION MEDICATION MANAGEMENT

## 2024-05-30 PROCEDURE — 99212 OFFICE O/P EST SF 10 MIN: CPT

## 2024-05-30 RX ORDER — TRETIONIN 0.5 MG/G
1 CREAM TOPICAL QD
Qty: 45 | Refills: 6 | Status: ERX | COMMUNITY
Start: 2024-05-30

## 2024-05-30 RX ADMIN — TRETIONIN 1: 0.5 CREAM TOPICAL at 00:00

## 2024-05-30 ASSESSMENT — LOCATION DETAILED DESCRIPTION DERM
LOCATION DETAILED: LEFT MEDIAL FOREHEAD
LOCATION DETAILED: RIGHT SUPERIOR MEDIAL FOREHEAD

## 2024-05-30 ASSESSMENT — LOCATION ZONE DERM: LOCATION ZONE: FACE

## 2024-05-30 ASSESSMENT — LOCATION SIMPLE DESCRIPTION DERM
LOCATION SIMPLE: LEFT FOREHEAD
LOCATION SIMPLE: RIGHT FOREHEAD

## 2024-05-30 NOTE — HPI: MEDICATION (IMIQUIMOD)
Is This A New Presentation, Or A Follow-Up?: Follow Up Imiquimod Therapy
What Type Of Imiquimod Are You Applying: generic 5% cream
How Severe Are The Lesions?: mild
How Many Weeks Of Imiquimod Have You Completed?: 4

## 2024-05-30 NOTE — PROCEDURE: PRESCRIPTION MEDICATION MANAGEMENT
Render In Strict Bullet Format?: No
Initiate Treatment: Tretinoin Cream
Plan: Will plan for repeat Aldara in 2-3 months (along part)
Detail Level: Zone

## 2024-05-30 NOTE — PROCEDURE: ADDITIONAL NOTES
Detail Level: Simple
Additional Notes: Recommended to use the Imiquimod 5 nights a week for two weeks on hairline after the summer
Render Risk Assessment In Note?: no

## 2024-10-03 ENCOUNTER — APPOINTMENT (RX ONLY)
Dept: URBAN - METROPOLITAN AREA CLINIC 6 | Facility: CLINIC | Age: 39
Setting detail: DERMATOLOGY
End: 2024-10-03

## 2024-10-03 DIAGNOSIS — Z71.89 OTHER SPECIFIED COUNSELING: ICD-10-CM

## 2024-10-03 DIAGNOSIS — L81.4 OTHER MELANIN HYPERPIGMENTATION: ICD-10-CM

## 2024-10-03 DIAGNOSIS — D18.0 HEMANGIOMA: ICD-10-CM

## 2024-10-03 DIAGNOSIS — L57.8 OTHER SKIN CHANGES DUE TO CHRONIC EXPOSURE TO NONIONIZING RADIATION: ICD-10-CM

## 2024-10-03 DIAGNOSIS — L82.1 OTHER SEBORRHEIC KERATOSIS: ICD-10-CM

## 2024-10-03 DIAGNOSIS — L91.0 HYPERTROPHIC SCAR: ICD-10-CM

## 2024-10-03 DIAGNOSIS — L57.0 ACTINIC KERATOSIS: ICD-10-CM

## 2024-10-03 DIAGNOSIS — Z85.828 PERSONAL HISTORY OF OTHER MALIGNANT NEOPLASM OF SKIN: ICD-10-CM

## 2024-10-03 DIAGNOSIS — D22 MELANOCYTIC NEVI: ICD-10-CM

## 2024-10-03 DIAGNOSIS — I78.8 OTHER DISEASES OF CAPILLARIES: ICD-10-CM

## 2024-10-03 PROBLEM — D22.5 MELANOCYTIC NEVI OF TRUNK: Status: ACTIVE | Noted: 2024-10-03

## 2024-10-03 PROBLEM — D18.01 HEMANGIOMA OF SKIN AND SUBCUTANEOUS TISSUE: Status: ACTIVE | Noted: 2024-10-03

## 2024-10-03 PROBLEM — D22.39 MELANOCYTIC NEVI OF OTHER PARTS OF FACE: Status: ACTIVE | Noted: 2024-10-03

## 2024-10-03 PROBLEM — D23.62 OTHER BENIGN NEOPLASM OF SKIN OF LEFT UPPER LIMB, INCLUDING SHOULDER: Status: ACTIVE | Noted: 2024-10-03

## 2024-10-03 PROCEDURE — 99213 OFFICE O/P EST LOW 20 MIN: CPT | Mod: 25

## 2024-10-03 PROCEDURE — 17000 DESTRUCT PREMALG LESION: CPT

## 2024-10-03 PROCEDURE — ? LIQUID NITROGEN

## 2024-10-03 PROCEDURE — ? COUNSELING

## 2024-10-03 PROCEDURE — ? OBSERVATION

## 2024-10-03 PROCEDURE — ? ADDITIONAL NOTES

## 2024-10-03 PROCEDURE — ? SUNSCREEN TREATMENT REGIMEN

## 2024-10-03 ASSESSMENT — LOCATION DETAILED DESCRIPTION DERM
LOCATION DETAILED: RIGHT MEDIAL FOREHEAD
LOCATION DETAILED: RIGHT SUPERIOR MEDIAL FOREHEAD
LOCATION DETAILED: MEDIAL FRONTAL SCALP
LOCATION DETAILED: RIGHT MEDIAL SUPERIOR CHEST
LOCATION DETAILED: SUPERIOR THORACIC SPINE
LOCATION DETAILED: LEFT RADIAL DORSAL HAND
LOCATION DETAILED: RIGHT RADIAL DORSAL HAND
LOCATION DETAILED: EPIGASTRIC SKIN
LOCATION DETAILED: LEFT INFERIOR MEDIAL MALAR CHEEK
LOCATION DETAILED: PERIUMBILICAL SKIN
LOCATION DETAILED: LEFT ULNAR DORSAL HAND
LOCATION DETAILED: LEFT FOREHEAD
LOCATION DETAILED: LEFT MEDIAL MALAR CHEEK
LOCATION DETAILED: LEFT CENTRAL OCCIPITAL SCALP

## 2024-10-03 ASSESSMENT — LOCATION SIMPLE DESCRIPTION DERM
LOCATION SIMPLE: UPPER BACK
LOCATION SIMPLE: SCALP
LOCATION SIMPLE: CHEST
LOCATION SIMPLE: RIGHT HAND
LOCATION SIMPLE: LEFT FOREHEAD
LOCATION SIMPLE: LEFT HAND
LOCATION SIMPLE: LEFT CHEEK
LOCATION SIMPLE: RIGHT FOREHEAD
LOCATION SIMPLE: FRONTAL SCALP
LOCATION SIMPLE: ABDOMEN

## 2024-10-03 ASSESSMENT — LOCATION ZONE DERM
LOCATION ZONE: FACE
LOCATION ZONE: SCALP
LOCATION ZONE: TRUNK
LOCATION ZONE: HAND

## 2024-10-03 NOTE — PROCEDURE: LIQUID NITROGEN
Consent: The patient's consent was obtained including but not limited to risks of crusting, scabbing, blistering, scarring, darker or lighter pigmentary change, recurrence, incomplete removal and infection.
Duration Of Freeze Thaw-Cycle (Seconds): 3
Detail Level: Detailed
Show Applicator Variable?: Yes
Render Note In Bullet Format When Appropriate: No
Post-Care Instructions: I reviewed with the patient in detail post-care instructions. Patient is to wear sunprotection, and avoid picking at any of the treated lesions. Pt may apply Vaseline to crusted or scabbing areas.

## 2024-12-03 ENCOUNTER — APPOINTMENT (OUTPATIENT)
Dept: URBAN - METROPOLITAN AREA CLINIC 6 | Facility: CLINIC | Age: 39
Setting detail: DERMATOLOGY
End: 2024-12-03

## 2024-12-03 DIAGNOSIS — L57.0 ACTINIC KERATOSIS: ICD-10-CM

## 2024-12-03 DIAGNOSIS — L82.1 OTHER SEBORRHEIC KERATOSIS: ICD-10-CM

## 2024-12-03 PROCEDURE — ? COUNSELING

## 2024-12-03 PROCEDURE — ? LIQUID NITROGEN

## 2024-12-03 PROCEDURE — 17000 DESTRUCT PREMALG LESION: CPT

## 2024-12-03 PROCEDURE — 99212 OFFICE O/P EST SF 10 MIN: CPT | Mod: 25

## 2024-12-03 ASSESSMENT — LOCATION DETAILED DESCRIPTION DERM
LOCATION DETAILED: LEFT FOREHEAD
LOCATION DETAILED: LEFT INFERIOR MEDIAL FOREHEAD

## 2024-12-03 ASSESSMENT — LOCATION SIMPLE DESCRIPTION DERM: LOCATION SIMPLE: LEFT FOREHEAD

## 2024-12-03 ASSESSMENT — LOCATION ZONE DERM: LOCATION ZONE: FACE

## 2024-12-18 ENCOUNTER — APPOINTMENT (OUTPATIENT)
Dept: URBAN - METROPOLITAN AREA CLINIC 20 | Facility: CLINIC | Age: 39
Setting detail: DERMATOLOGY
End: 2024-12-18

## 2024-12-18 DIAGNOSIS — L57.0 ACTINIC KERATOSIS: ICD-10-CM

## 2024-12-18 PROCEDURE — ? PHOTO-DOCUMENTATION

## 2024-12-18 PROCEDURE — 96567 PDT DSTR PRMLG LES SKN: CPT

## 2024-12-18 PROCEDURE — ? PHOTODYNAMIC THERAPY COUNSELING

## 2024-12-18 PROCEDURE — ? PDT: RED

## 2024-12-18 ASSESSMENT — LOCATION DETAILED DESCRIPTION DERM: LOCATION DETAILED: SUPERIOR MID FOREHEAD

## 2024-12-18 ASSESSMENT — LOCATION ZONE DERM: LOCATION ZONE: FACE

## 2024-12-18 ASSESSMENT — LOCATION SIMPLE DESCRIPTION DERM: LOCATION SIMPLE: SUPERIOR FOREHEAD

## 2024-12-18 NOTE — PROCEDURE: PDT: RED
Medical Necessity: Precancerous Lesions
Ndc# (Optional): 54580-747-69
Debridement Text (Will Only Render In Visit Note If You Select Debridement Option Under Who Performed The Pdt Field): Prior to application of the photodynamic medication the hyperkeratotic lesions were curetted to make them more amenable to therapy.
Number Of Kerasticks/Tubes Of Metvixia/Tubes Of Ameluz Used: 1
Total Number Of Aks Treated (Optional To Report): 0
Light Source: 635nm LED
Was Levulan/Metvixia/Ameluz Applied On A Previous Day?: No
Who Performed The Pdt (Optional): Crystal Castro RN
Show Medical Necessity In Plan?: Yes
Detail Level: Zone
Incubation Time (Set To 00:00:00 If Not Wanted): 01:30:00
Photosensitizer: Ameluz
Illumination Time: 7 min 7 sec
Application Method: without occlusion
Lot # (Optional): [id number]
Who Performed The Pdt?: Performed by Nurse, MA or  with Pre-Procedure Debridement of Hyperkeratotic Lesions ([id number])
Pre-Procedure Text: The treatment areas were cleaned and prepped in the usual fashion.
Post-Care Instructions: I reviewed with the patient in detail post-care instructions. Patient is to avoid sunlight for the next 2 days, and wear sun protection. Patients may expect sunburn like redness, discomfort and scabbing.
Anesthesia Type: 1% lidocaine with epinephrine
Consent: Written consent obtained.  The risks were reviewed with the patient including but not limited to: pigmentary changes, pain, blistering, scabbing, redness, and the remote possibility of scarring.
Treatment Number: 2
Expiration Date (Optional): 6/2025

## 2024-12-18 NOTE — HPI: PHOTODYNAMIC THERAPY (PDT)
Have You Had Previous Treatments With Pdt Before?: has had previous treatments
When Outside In The Sun, Do You...: mostly burns, rarely tans
Number Of Treatments: 1
When Was Your Last Pdt Treatment?: 05/06/2024

## 2025-01-06 ENCOUNTER — APPOINTMENT (OUTPATIENT)
Dept: URBAN - METROPOLITAN AREA CLINIC 6 | Facility: CLINIC | Age: 40
Setting detail: DERMATOLOGY
End: 2025-01-06

## 2025-01-06 DIAGNOSIS — D485 NEOPLASM OF UNCERTAIN BEHAVIOR OF SKIN: ICD-10-CM

## 2025-01-06 DIAGNOSIS — L57.8 OTHER SKIN CHANGES DUE TO CHRONIC EXPOSURE TO NONIONIZING RADIATION: ICD-10-CM | Status: INADEQUATELY CONTROLLED

## 2025-01-06 PROBLEM — D48.5 NEOPLASM OF UNCERTAIN BEHAVIOR OF SKIN: Status: ACTIVE | Noted: 2025-01-06

## 2025-01-06 PROCEDURE — ? COUNSELING

## 2025-01-06 PROCEDURE — 11102 TANGNTL BX SKIN SINGLE LES: CPT

## 2025-01-06 PROCEDURE — ? BIOPSY BY SHAVE METHOD

## 2025-01-06 PROCEDURE — 99213 OFFICE O/P EST LOW 20 MIN: CPT | Mod: 25

## 2025-01-06 PROCEDURE — ? PRESCRIPTION MEDICATION MANAGEMENT

## 2025-01-06 PROCEDURE — ? PRESCRIPTION

## 2025-01-06 RX ORDER — FLUOROURACIL 5 MG/G
1 CREAM TOPICAL BID
Qty: 40 | Refills: 3 | Status: ERX | COMMUNITY
Start: 2025-01-06

## 2025-01-06 RX ADMIN — FLUOROURACIL 1: 5 CREAM TOPICAL at 00:00

## 2025-01-06 ASSESSMENT — LOCATION ZONE DERM
LOCATION ZONE: ARM
LOCATION ZONE: SCALP

## 2025-01-06 ASSESSMENT — LOCATION DETAILED DESCRIPTION DERM
LOCATION DETAILED: RIGHT DISTAL RADIAL DORSAL FOREARM
LOCATION DETAILED: MID-FRONTAL SCALP

## 2025-01-06 ASSESSMENT — LOCATION SIMPLE DESCRIPTION DERM
LOCATION SIMPLE: ANTERIOR SCALP
LOCATION SIMPLE: RIGHT FOREARM

## 2025-01-27 ENCOUNTER — OFFICE VISIT (OUTPATIENT)
Dept: URGENT CARE | Facility: CLINIC | Age: 40
End: 2025-01-27
Payer: COMMERCIAL

## 2025-01-27 ENCOUNTER — HOSPITAL ENCOUNTER (OUTPATIENT)
Facility: MEDICAL CENTER | Age: 40
End: 2025-01-27
Attending: STUDENT IN AN ORGANIZED HEALTH CARE EDUCATION/TRAINING PROGRAM
Payer: COMMERCIAL

## 2025-01-27 VITALS
SYSTOLIC BLOOD PRESSURE: 128 MMHG | WEIGHT: 138 LBS | BODY MASS INDEX: 22.99 KG/M2 | TEMPERATURE: 97.4 F | RESPIRATION RATE: 18 BRPM | OXYGEN SATURATION: 99 % | HEIGHT: 65 IN | HEART RATE: 78 BPM | DIASTOLIC BLOOD PRESSURE: 62 MMHG

## 2025-01-27 DIAGNOSIS — R39.9 UTI SYMPTOMS: ICD-10-CM

## 2025-01-27 DIAGNOSIS — N89.8 VAGINAL ITCHING: ICD-10-CM

## 2025-01-27 LAB
APPEARANCE UR: CLEAR
BILIRUB UR STRIP-MCNC: NEGATIVE MG/DL
COLOR UR AUTO: YELLOW
GLUCOSE UR STRIP.AUTO-MCNC: NEGATIVE MG/DL
KETONES UR STRIP.AUTO-MCNC: NEGATIVE MG/DL
LEUKOCYTE ESTERASE UR QL STRIP.AUTO: NORMAL
NITRITE UR QL STRIP.AUTO: NEGATIVE
PH UR STRIP.AUTO: 6 [PH] (ref 5–8)
POCT INT CON NEG: NEGATIVE
POCT INT CON POS: POSITIVE
POCT URINE PREGNANCY TEST: NEGATIVE
PROT UR QL STRIP: NEGATIVE MG/DL
RBC UR QL AUTO: NORMAL
SP GR UR STRIP.AUTO: 1.03
UROBILINOGEN UR STRIP-MCNC: 0.2 MG/DL

## 2025-01-27 PROCEDURE — 81025 URINE PREGNANCY TEST: CPT | Performed by: STUDENT IN AN ORGANIZED HEALTH CARE EDUCATION/TRAINING PROGRAM

## 2025-01-27 PROCEDURE — 87480 CANDIDA DNA DIR PROBE: CPT

## 2025-01-27 PROCEDURE — 3078F DIAST BP <80 MM HG: CPT | Performed by: STUDENT IN AN ORGANIZED HEALTH CARE EDUCATION/TRAINING PROGRAM

## 2025-01-27 PROCEDURE — 99213 OFFICE O/P EST LOW 20 MIN: CPT | Performed by: STUDENT IN AN ORGANIZED HEALTH CARE EDUCATION/TRAINING PROGRAM

## 2025-01-27 PROCEDURE — 87086 URINE CULTURE/COLONY COUNT: CPT

## 2025-01-27 PROCEDURE — 87510 GARDNER VAG DNA DIR PROBE: CPT

## 2025-01-27 PROCEDURE — 87660 TRICHOMONAS VAGIN DIR PROBE: CPT

## 2025-01-27 PROCEDURE — 3074F SYST BP LT 130 MM HG: CPT | Performed by: STUDENT IN AN ORGANIZED HEALTH CARE EDUCATION/TRAINING PROGRAM

## 2025-01-27 PROCEDURE — 81002 URINALYSIS NONAUTO W/O SCOPE: CPT | Performed by: STUDENT IN AN ORGANIZED HEALTH CARE EDUCATION/TRAINING PROGRAM

## 2025-01-27 ASSESSMENT — ENCOUNTER SYMPTOMS
DIARRHEA: 0
FEVER: 0
FLANK PAIN: 0
NAUSEA: 0
CONSTIPATION: 0
CHILLS: 0
VOMITING: 0
ABDOMINAL PAIN: 0

## 2025-01-27 NOTE — PROGRESS NOTES
"Subjective     Lisa Watters is a 39 y.o. female who presents with UTI ( symptoms started last Wednesday )            Lisa is a 39 y.o. female who presents to urgent care with concerns for UTI or yeast infection.  Reports some dysuria.  She also reports vaginal itching/vaginal discomfort.  No fever/chills.  No abdominal pain or flank pain.  Patient has been treating with OTC Monistat which helped somewhat.  No vaginal discharge or odor.        Review of Systems   Constitutional:  Negative for chills and fever.   Gastrointestinal:  Negative for abdominal pain, constipation, diarrhea, nausea and vomiting.   Genitourinary:  Positive for dysuria. Negative for flank pain, frequency, hematuria and urgency.        + vaginal itching   All other systems reviewed and are negative.             Objective     /62 (BP Location: Left arm, Patient Position: Sitting, BP Cuff Size: Adult)   Pulse 78   Temp 36.3 °C (97.4 °F) (Temporal)   Resp 18   Ht 1.651 m (5' 5\")   Wt 62.6 kg (138 lb)   SpO2 99%   BMI 22.96 kg/m²      Physical Exam  Vitals reviewed.   Constitutional:       General: She is not in acute distress.     Appearance: Normal appearance. She is not ill-appearing, toxic-appearing or diaphoretic.   HENT:      Head: Normocephalic and atraumatic.      Nose: Nose normal.   Eyes:      Extraocular Movements: Extraocular movements intact.      Conjunctiva/sclera: Conjunctivae normal.      Pupils: Pupils are equal, round, and reactive to light.   Cardiovascular:      Rate and Rhythm: Normal rate.   Pulmonary:      Effort: Pulmonary effort is normal.   Abdominal:      General: Abdomen is flat.      Palpations: Abdomen is soft.      Tenderness: There is no right CVA tenderness or left CVA tenderness.   Skin:     General: Skin is warm and dry.   Neurological:      General: No focal deficit present.      Mental Status: She is alert and oriented to person, place, and time.                       "       Assessment & Plan        Assessment & Plan  UTI symptoms    Orders:    POCT Pregnancy    POCT Urinalysis    VAGINAL PATHOGENS DNA PANEL; Future    URINE CULTURE(NEW); Future    Vaginal itching              Patient would like to wait until vaginal path and urine culture results come back prior to starting treatment.  Patient has been using OTC Monistat which has helped somewhat with symptoms.  Patient is trying to get pregnant currently with her  and does not want to take fluconazole.  Symptoms are more consistent with either yeast infection or BV.  Vac path and urine culture sent out to lab.  Will contact patient of any positive results and started on appropriate treatment at that time.  Patient advised that results will also be available on [facility].    Differential diagnoses, supportive care measures and indications for immediate follow-up discussed with patient. Pathogenesis of diagnosis discussed including typical length and natural progression.      Instructed to return to urgent care or nearest emergency department if symptoms fail to improve, for any change in condition, further concerns, or new concerning symptoms.    Patient states understanding and agrees with the plan of care and discharge instructions.

## 2025-01-28 ENCOUNTER — TELEPHONE (OUTPATIENT)
Dept: URGENT CARE | Facility: PHYSICIAN GROUP | Age: 40
End: 2025-01-28
Payer: COMMERCIAL

## 2025-01-28 DIAGNOSIS — B37.9 YEAST INFECTION: ICD-10-CM

## 2025-01-28 LAB
CANDIDA DNA VAG QL PROBE+SIG AMP: POSITIVE
G VAGINALIS DNA VAG QL PROBE+SIG AMP: NEGATIVE
T VAGINALIS DNA VAG QL PROBE+SIG AMP: NEGATIVE

## 2025-01-28 RX ORDER — CLOTRIMAZOLE 1 %
CREAM WITH APPLICATOR VAGINAL
Qty: 45 G | Refills: 0 | Status: SHIPPED | OUTPATIENT
Start: 2025-01-28

## 2025-01-28 NOTE — TELEPHONE ENCOUNTER
See below results.  Prescription sent to pharmacy on file.     Latest Reference Range & Units 01/27/25 11:37   Candida species DNA Probe Negative  POSITIVE !   Gardnerella vaginalis DNA Probe Negative  Negative   Trichamonas vaginalis DNA Probe Negative  Negative   !: Data is abnormal    Please return to urgent care or follow up with your PCP if symptoms fail to improve, for any change in condition, further concerns or new concerning symptoms.

## 2025-01-30 LAB
BACTERIA UR CULT: NORMAL
SIGNIFICANT IND 70042: NORMAL
SITE SITE: NORMAL
SOURCE SOURCE: NORMAL

## 2025-05-29 ENCOUNTER — APPOINTMENT (OUTPATIENT)
Dept: URBAN - METROPOLITAN AREA CLINIC 6 | Facility: CLINIC | Age: 40
Setting detail: DERMATOLOGY
End: 2025-05-29

## 2025-05-29 DIAGNOSIS — Z85.828 PERSONAL HISTORY OF OTHER MALIGNANT NEOPLASM OF SKIN: ICD-10-CM

## 2025-05-29 DIAGNOSIS — D18.0 HEMANGIOMA: ICD-10-CM

## 2025-05-29 DIAGNOSIS — I78.8 OTHER DISEASES OF CAPILLARIES: ICD-10-CM

## 2025-05-29 DIAGNOSIS — L82.1 OTHER SEBORRHEIC KERATOSIS: ICD-10-CM

## 2025-05-29 DIAGNOSIS — D22 MELANOCYTIC NEVI: ICD-10-CM

## 2025-05-29 DIAGNOSIS — L57.0 ACTINIC KERATOSIS: ICD-10-CM

## 2025-05-29 DIAGNOSIS — Z71.89 OTHER SPECIFIED COUNSELING: ICD-10-CM

## 2025-05-29 DIAGNOSIS — L81.4 OTHER MELANIN HYPERPIGMENTATION: ICD-10-CM

## 2025-05-29 PROBLEM — D48.5 NEOPLASM OF UNCERTAIN BEHAVIOR OF SKIN: Status: ACTIVE | Noted: 2025-05-29

## 2025-05-29 PROBLEM — D22.39 MELANOCYTIC NEVI OF OTHER PARTS OF FACE: Status: ACTIVE | Noted: 2025-05-29

## 2025-05-29 PROBLEM — D23.62 OTHER BENIGN NEOPLASM OF SKIN OF LEFT UPPER LIMB, INCLUDING SHOULDER: Status: ACTIVE | Noted: 2025-05-29

## 2025-05-29 PROBLEM — D22.5 MELANOCYTIC NEVI OF TRUNK: Status: ACTIVE | Noted: 2025-05-29

## 2025-05-29 PROBLEM — D18.01 HEMANGIOMA OF SKIN AND SUBCUTANEOUS TISSUE: Status: ACTIVE | Noted: 2025-05-29

## 2025-05-29 PROCEDURE — ? OBSERVATION

## 2025-05-29 PROCEDURE — ? COUNSELING

## 2025-05-29 PROCEDURE — ? SUNSCREEN TREATMENT REGIMEN

## 2025-05-29 PROCEDURE — 99213 OFFICE O/P EST LOW 20 MIN: CPT | Mod: 25

## 2025-05-29 PROCEDURE — 17000 DESTRUCT PREMALG LESION: CPT | Mod: 59

## 2025-05-29 PROCEDURE — 11102 TANGNTL BX SKIN SINGLE LES: CPT

## 2025-05-29 PROCEDURE — ? LIQUID NITROGEN

## 2025-05-29 PROCEDURE — ? BIOPSY BY SHAVE METHOD

## 2025-05-29 ASSESSMENT — LOCATION ZONE DERM
LOCATION ZONE: FACE
LOCATION ZONE: TRUNK
LOCATION ZONE: SCALP
LOCATION ZONE: HAND

## 2025-05-29 ASSESSMENT — LOCATION DETAILED DESCRIPTION DERM
LOCATION DETAILED: LEFT RADIAL DORSAL HAND
LOCATION DETAILED: SUPERIOR THORACIC SPINE
LOCATION DETAILED: RIGHT SUPERIOR MEDIAL FOREHEAD
LOCATION DETAILED: EPIGASTRIC SKIN
LOCATION DETAILED: LEFT INFERIOR MEDIAL MALAR CHEEK
LOCATION DETAILED: LEFT ULNAR DORSAL HAND
LOCATION DETAILED: PERIUMBILICAL SKIN
LOCATION DETAILED: LEFT MEDIAL MALAR CHEEK
LOCATION DETAILED: RIGHT MEDIAL SUPERIOR CHEST
LOCATION DETAILED: RIGHT RADIAL DORSAL HAND
LOCATION DETAILED: MID-FRONTAL SCALP
LOCATION DETAILED: MEDIAL FRONTAL SCALP
LOCATION DETAILED: RIGHT MEDIAL FOREHEAD

## 2025-05-29 ASSESSMENT — LOCATION SIMPLE DESCRIPTION DERM
LOCATION SIMPLE: FRONTAL SCALP
LOCATION SIMPLE: UPPER BACK
LOCATION SIMPLE: RIGHT FOREHEAD
LOCATION SIMPLE: LEFT HAND
LOCATION SIMPLE: LEFT CHEEK
LOCATION SIMPLE: CHEST
LOCATION SIMPLE: RIGHT HAND
LOCATION SIMPLE: ABDOMEN
LOCATION SIMPLE: ANTERIOR SCALP

## 2025-06-23 ENCOUNTER — APPOINTMENT (OUTPATIENT)
Dept: URBAN - METROPOLITAN AREA CLINIC 6 | Facility: CLINIC | Age: 40
Setting detail: DERMATOLOGY
End: 2025-06-23

## 2025-06-23 PROBLEM — C44.319 BASAL CELL CARCINOMA OF SKIN OF OTHER PARTS OF FACE: Status: ACTIVE | Noted: 2025-06-23

## 2025-06-23 PROCEDURE — ? COUNSELING

## 2025-06-23 PROCEDURE — ? ADDITIONAL NOTES

## 2025-06-23 PROCEDURE — ? DIAGNOSIS COMMENT

## 2025-06-23 PROCEDURE — ? PATHOLOGY DISCUSSION

## 2025-06-23 NOTE — PROCEDURE: DIAGNOSIS COMMENT
Render Risk Assessment In Note?: no
Detail Level: Detailed
Comment: Accession# [id number]\\n\\nA. Left Forehead, Biopsy by Shave Method\\nBASAL CELL CARCINOMA, SUPERFICIAL MULTIFOCAL TYPE

## 2025-06-23 NOTE — PROCEDURE: ADDITIONAL NOTES
Detail Level: Simple
Additional Notes: Leann is 15 weeks pregnant. Discussed treatment options including MOHS, cryosurgery, monitoring, Imiquimod after pregnancy or rebiopsy. Leann elects to monitor and recheck at her [facility] 11/2025. She understands the risk of leaving lesion untreated.
Render Risk Assessment In Note?: no